# Patient Record
Sex: MALE | Race: WHITE | Employment: OTHER | URBAN - METROPOLITAN AREA
[De-identification: names, ages, dates, MRNs, and addresses within clinical notes are randomized per-mention and may not be internally consistent; named-entity substitution may affect disease eponyms.]

---

## 2020-01-21 ENCOUNTER — HOSPITAL ENCOUNTER (INPATIENT)
Age: 85
LOS: 5 days | Discharge: NURSING, REHAB OR LONG-TERM CARE | DRG: 193 | End: 2020-01-27
Attending: EMERGENCY MEDICINE | Admitting: INTERNAL MEDICINE
Payer: MEDICARE

## 2020-01-21 ENCOUNTER — APPOINTMENT (OUTPATIENT)
Dept: GENERAL RADIOLOGY | Age: 85
DRG: 193 | End: 2020-01-21
Payer: MEDICARE

## 2020-01-21 ENCOUNTER — APPOINTMENT (OUTPATIENT)
Dept: CT IMAGING | Age: 85
DRG: 193 | End: 2020-01-21
Payer: MEDICARE

## 2020-01-21 LAB
A/G RATIO: 1 (ref 1.1–2.2)
ALBUMIN SERPL-MCNC: 4.1 G/DL (ref 3.4–5)
ALP BLD-CCNC: 109 U/L (ref 40–129)
ALT SERPL-CCNC: 24 U/L (ref 10–40)
ANION GAP SERPL CALCULATED.3IONS-SCNC: 10 MMOL/L (ref 3–16)
AST SERPL-CCNC: 27 U/L (ref 15–37)
BACTERIA: ABNORMAL /HPF
BASOPHILS ABSOLUTE: 0.1 K/UL (ref 0–0.2)
BASOPHILS RELATIVE PERCENT: 0.7 %
BILIRUB SERPL-MCNC: 1.1 MG/DL (ref 0–1)
BILIRUBIN URINE: ABNORMAL
BLOOD, URINE: ABNORMAL
BUN BLDV-MCNC: 25 MG/DL (ref 7–20)
CALCIUM SERPL-MCNC: 9.9 MG/DL (ref 8.3–10.6)
CASTS: ABNORMAL /LPF
CHLORIDE BLD-SCNC: 101 MMOL/L (ref 99–110)
CLARITY: CLEAR
CO2: 24 MMOL/L (ref 21–32)
COLOR: YELLOW
CREAT SERPL-MCNC: 1.7 MG/DL (ref 0.8–1.3)
EOSINOPHILS ABSOLUTE: 0 K/UL (ref 0–0.6)
EOSINOPHILS RELATIVE PERCENT: 0.2 %
EPITHELIAL CELLS, UA: ABNORMAL /HPF
GFR AFRICAN AMERICAN: 47
GFR NON-AFRICAN AMERICAN: 39
GLOBULIN: 4.1 G/DL
GLUCOSE BLD-MCNC: 174 MG/DL (ref 70–99)
GLUCOSE URINE: NEGATIVE MG/DL
HCT VFR BLD CALC: 49.9 % (ref 40.5–52.5)
HEMOGLOBIN: 16.9 G/DL (ref 13.5–17.5)
KETONES, URINE: NEGATIVE MG/DL
LEUKOCYTE ESTERASE, URINE: NEGATIVE
LYMPHOCYTES ABSOLUTE: 1.9 K/UL (ref 1–5.1)
LYMPHOCYTES RELATIVE PERCENT: 11.6 %
MCH RBC QN AUTO: 33.4 PG (ref 26–34)
MCHC RBC AUTO-ENTMCNC: 33.8 G/DL (ref 31–36)
MCV RBC AUTO: 98.8 FL (ref 80–100)
MICROSCOPIC EXAMINATION: YES
MONOCYTES ABSOLUTE: 1.5 K/UL (ref 0–1.3)
MONOCYTES RELATIVE PERCENT: 9.2 %
MUCUS: ABNORMAL /LPF
NEUTROPHILS ABSOLUTE: 12.7 K/UL (ref 1.7–7.7)
NEUTROPHILS RELATIVE PERCENT: 78.3 %
NITRITE, URINE: NEGATIVE
PDW BLD-RTO: 12.7 % (ref 12.4–15.4)
PH UA: 5 (ref 5–8)
PLATELET # BLD: 210 K/UL (ref 135–450)
PMV BLD AUTO: 7.6 FL (ref 5–10.5)
POTASSIUM REFLEX MAGNESIUM: 4.4 MMOL/L (ref 3.5–5.1)
PROTEIN UA: 30 MG/DL
RBC # BLD: 5.05 M/UL (ref 4.2–5.9)
RBC UA: ABNORMAL /HPF (ref 0–2)
SODIUM BLD-SCNC: 135 MMOL/L (ref 136–145)
SPECIFIC GRAVITY UA: >=1.03 (ref 1–1.03)
TOTAL PROTEIN: 8.2 G/DL (ref 6.4–8.2)
TROPONIN: 0.03 NG/ML
URINE REFLEX TO CULTURE: ABNORMAL
URINE TYPE: ABNORMAL
UROBILINOGEN, URINE: 0.2 E.U./DL
WBC # BLD: 16.2 K/UL (ref 4–11)
WBC UA: ABNORMAL /HPF (ref 0–5)

## 2020-01-21 PROCEDURE — 80053 COMPREHEN METABOLIC PANEL: CPT

## 2020-01-21 PROCEDURE — 85025 COMPLETE CBC W/AUTO DIFF WBC: CPT

## 2020-01-21 PROCEDURE — 71045 X-RAY EXAM CHEST 1 VIEW: CPT

## 2020-01-21 PROCEDURE — 81001 URINALYSIS AUTO W/SCOPE: CPT

## 2020-01-21 PROCEDURE — 70450 CT HEAD/BRAIN W/O DYE: CPT

## 2020-01-21 PROCEDURE — 84484 ASSAY OF TROPONIN QUANT: CPT

## 2020-01-21 PROCEDURE — 99284 EMERGENCY DEPT VISIT MOD MDM: CPT

## 2020-01-21 RX ORDER — MEMANTINE HYDROCHLORIDE 5 MG/1
5 TABLET ORAL 2 TIMES DAILY
COMMUNITY

## 2020-01-21 RX ORDER — 0.9 % SODIUM CHLORIDE 0.9 %
1000 INTRAVENOUS SOLUTION INTRAVENOUS ONCE
Status: COMPLETED | OUTPATIENT
Start: 2020-01-21 | End: 2020-01-22

## 2020-01-21 RX ORDER — LISINOPRIL 2.5 MG/1
2.5 TABLET ORAL DAILY
COMMUNITY

## 2020-01-21 RX ORDER — SILODOSIN 8 MG/1
8 CAPSULE ORAL EVERY EVENING
COMMUNITY

## 2020-01-21 RX ORDER — ATENOLOL 25 MG/1
25 TABLET ORAL DAILY
COMMUNITY

## 2020-01-22 ENCOUNTER — APPOINTMENT (OUTPATIENT)
Dept: MRI IMAGING | Age: 85
DRG: 193 | End: 2020-01-22
Payer: MEDICARE

## 2020-01-22 PROBLEM — N17.9 AKI (ACUTE KIDNEY INJURY) (HCC): Status: ACTIVE | Noted: 2020-01-22

## 2020-01-22 PROBLEM — J18.9 PNEUMONIA: Status: ACTIVE | Noted: 2020-01-22

## 2020-01-22 PROBLEM — R41.82 AMS (ALTERED MENTAL STATUS): Status: ACTIVE | Noted: 2020-01-22

## 2020-01-22 LAB
ANION GAP SERPL CALCULATED.3IONS-SCNC: 12 MMOL/L (ref 3–16)
ATYPICAL LYMPHOCYTE RELATIVE PERCENT: 2 % (ref 0–6)
BANDED NEUTROPHILS RELATIVE PERCENT: 6 % (ref 0–7)
BASOPHILS ABSOLUTE: 0 K/UL (ref 0–0.2)
BASOPHILS RELATIVE PERCENT: 0 %
BUN BLDV-MCNC: 21 MG/DL (ref 7–20)
CALCIUM SERPL-MCNC: 9 MG/DL (ref 8.3–10.6)
CHLORIDE BLD-SCNC: 107 MMOL/L (ref 99–110)
CO2: 23 MMOL/L (ref 21–32)
CREAT SERPL-MCNC: 1.2 MG/DL (ref 0.8–1.3)
EKG ATRIAL RATE: 111 BPM
EKG DIAGNOSIS: NORMAL
EKG P AXIS: 66 DEGREES
EKG P-R INTERVAL: 204 MS
EKG Q-T INTERVAL: 318 MS
EKG QRS DURATION: 88 MS
EKG QTC CALCULATION (BAZETT): 432 MS
EKG R AXIS: 60 DEGREES
EKG T AXIS: 75 DEGREES
EKG VENTRICULAR RATE: 111 BPM
EOSINOPHILS ABSOLUTE: 0.2 K/UL (ref 0–0.6)
EOSINOPHILS RELATIVE PERCENT: 1 %
GFR AFRICAN AMERICAN: >60
GFR NON-AFRICAN AMERICAN: 58
GLUCOSE BLD-MCNC: 156 MG/DL (ref 70–99)
HCT VFR BLD CALC: 43.9 % (ref 40.5–52.5)
HEMATOLOGY PATH CONSULT: NORMAL
HEMATOLOGY PATH CONSULT: YES
HEMOGLOBIN: 14.7 G/DL (ref 13.5–17.5)
LYMPHOCYTES ABSOLUTE: 7.9 K/UL (ref 1–5.1)
LYMPHOCYTES RELATIVE PERCENT: 48 %
MCH RBC QN AUTO: 33.4 PG (ref 26–34)
MCHC RBC AUTO-ENTMCNC: 33.5 G/DL (ref 31–36)
MCV RBC AUTO: 99.6 FL (ref 80–100)
METAMYELOCYTES RELATIVE PERCENT: 1 %
MONOCYTES ABSOLUTE: 0.2 K/UL (ref 0–1.3)
MONOCYTES RELATIVE PERCENT: 1 %
NEUTROPHILS ABSOLUTE: 7.6 K/UL (ref 1.7–7.7)
NEUTROPHILS RELATIVE PERCENT: 41 %
PDW BLD-RTO: 12.4 % (ref 12.4–15.4)
PLATELET # BLD: 177 K/UL (ref 135–450)
PLATELET SLIDE REVIEW: ADEQUATE
PMV BLD AUTO: 7.5 FL (ref 5–10.5)
POTASSIUM REFLEX MAGNESIUM: 3.9 MMOL/L (ref 3.5–5.1)
PROCALCITONIN: 0.13 NG/ML (ref 0–0.15)
RBC # BLD: 4.41 M/UL (ref 4.2–5.9)
SLIDE REVIEW: ABNORMAL
SODIUM BLD-SCNC: 142 MMOL/L (ref 136–145)
TROPONIN: 0.02 NG/ML
WBC # BLD: 15.8 K/UL (ref 4–11)

## 2020-01-22 PROCEDURE — 6360000002 HC RX W HCPCS: Performed by: EMERGENCY MEDICINE

## 2020-01-22 PROCEDURE — 93010 ELECTROCARDIOGRAM REPORT: CPT | Performed by: INTERNAL MEDICINE

## 2020-01-22 PROCEDURE — 94150 VITAL CAPACITY TEST: CPT

## 2020-01-22 PROCEDURE — 80048 BASIC METABOLIC PNL TOTAL CA: CPT

## 2020-01-22 PROCEDURE — 2580000003 HC RX 258: Performed by: NURSE PRACTITIONER

## 2020-01-22 PROCEDURE — 2580000003 HC RX 258: Performed by: EMERGENCY MEDICINE

## 2020-01-22 PROCEDURE — 93005 ELECTROCARDIOGRAM TRACING: CPT | Performed by: EMERGENCY MEDICINE

## 2020-01-22 PROCEDURE — 6360000002 HC RX W HCPCS: Performed by: NURSE PRACTITIONER

## 2020-01-22 PROCEDURE — 84145 PROCALCITONIN (PCT): CPT

## 2020-01-22 PROCEDURE — 94669 MECHANICAL CHEST WALL OSCILL: CPT

## 2020-01-22 PROCEDURE — 85025 COMPLETE CBC W/AUTO DIFF WBC: CPT

## 2020-01-22 PROCEDURE — 97535 SELF CARE MNGMENT TRAINING: CPT

## 2020-01-22 PROCEDURE — 97162 PT EVAL MOD COMPLEX 30 MIN: CPT

## 2020-01-22 PROCEDURE — 70551 MRI BRAIN STEM W/O DYE: CPT

## 2020-01-22 PROCEDURE — 96365 THER/PROPH/DIAG IV INF INIT: CPT

## 2020-01-22 PROCEDURE — 97166 OT EVAL MOD COMPLEX 45 MIN: CPT

## 2020-01-22 PROCEDURE — 1200000000 HC SEMI PRIVATE

## 2020-01-22 PROCEDURE — 84484 ASSAY OF TROPONIN QUANT: CPT

## 2020-01-22 PROCEDURE — 97116 GAIT TRAINING THERAPY: CPT

## 2020-01-22 PROCEDURE — 36415 COLL VENOUS BLD VENIPUNCTURE: CPT

## 2020-01-22 PROCEDURE — 6370000000 HC RX 637 (ALT 250 FOR IP): Performed by: NURSE PRACTITIONER

## 2020-01-22 RX ORDER — TAMSULOSIN HYDROCHLORIDE 0.4 MG/1
0.4 CAPSULE ORAL DAILY
Status: DISCONTINUED | OUTPATIENT
Start: 2020-01-22 | End: 2020-01-27 | Stop reason: HOSPADM

## 2020-01-22 RX ORDER — SODIUM CHLORIDE 0.9 % (FLUSH) 0.9 %
10 SYRINGE (ML) INJECTION EVERY 12 HOURS SCHEDULED
Status: DISCONTINUED | OUTPATIENT
Start: 2020-01-22 | End: 2020-01-27 | Stop reason: HOSPADM

## 2020-01-22 RX ORDER — ONDANSETRON 2 MG/ML
4 INJECTION INTRAMUSCULAR; INTRAVENOUS EVERY 6 HOURS PRN
Status: DISCONTINUED | OUTPATIENT
Start: 2020-01-22 | End: 2020-01-27 | Stop reason: HOSPADM

## 2020-01-22 RX ORDER — ATORVASTATIN CALCIUM 10 MG/1
10 TABLET, FILM COATED ORAL DAILY
Status: DISCONTINUED | OUTPATIENT
Start: 2020-01-22 | End: 2020-01-27 | Stop reason: HOSPADM

## 2020-01-22 RX ORDER — MEMANTINE HYDROCHLORIDE 5 MG/1
5 TABLET ORAL 2 TIMES DAILY
Status: DISCONTINUED | OUTPATIENT
Start: 2020-01-22 | End: 2020-01-27 | Stop reason: HOSPADM

## 2020-01-22 RX ORDER — SODIUM CHLORIDE 9 MG/ML
INJECTION, SOLUTION INTRAVENOUS CONTINUOUS
Status: DISCONTINUED | OUTPATIENT
Start: 2020-01-22 | End: 2020-01-27 | Stop reason: HOSPADM

## 2020-01-22 RX ORDER — ATENOLOL 25 MG/1
25 TABLET ORAL DAILY
Status: DISCONTINUED | OUTPATIENT
Start: 2020-01-22 | End: 2020-01-27 | Stop reason: HOSPADM

## 2020-01-22 RX ORDER — ACETAMINOPHEN 325 MG/1
650 TABLET ORAL EVERY 4 HOURS PRN
Status: DISCONTINUED | OUTPATIENT
Start: 2020-01-22 | End: 2020-01-27 | Stop reason: HOSPADM

## 2020-01-22 RX ORDER — CLOPIDOGREL BISULFATE 75 MG/1
75 TABLET ORAL DAILY
Status: DISCONTINUED | OUTPATIENT
Start: 2020-01-22 | End: 2020-01-27 | Stop reason: HOSPADM

## 2020-01-22 RX ORDER — ALBUTEROL SULFATE 2.5 MG/3ML
2.5 SOLUTION RESPIRATORY (INHALATION)
Status: DISCONTINUED | OUTPATIENT
Start: 2020-01-22 | End: 2020-01-22

## 2020-01-22 RX ORDER — LISINOPRIL 2.5 MG/1
2.5 TABLET ORAL DAILY
Status: DISCONTINUED | OUTPATIENT
Start: 2020-01-22 | End: 2020-01-27 | Stop reason: HOSPADM

## 2020-01-22 RX ORDER — ALBUTEROL SULFATE 2.5 MG/3ML
2.5 SOLUTION RESPIRATORY (INHALATION) EVERY 4 HOURS PRN
Status: DISCONTINUED | OUTPATIENT
Start: 2020-01-22 | End: 2020-01-27 | Stop reason: HOSPADM

## 2020-01-22 RX ORDER — SODIUM CHLORIDE 0.9 % (FLUSH) 0.9 %
10 SYRINGE (ML) INJECTION PRN
Status: DISCONTINUED | OUTPATIENT
Start: 2020-01-22 | End: 2020-01-27 | Stop reason: HOSPADM

## 2020-01-22 RX ADMIN — MEMANTINE 5 MG: 5 TABLET ORAL at 09:59

## 2020-01-22 RX ADMIN — TAMSULOSIN HYDROCHLORIDE 0.4 MG: 0.4 CAPSULE ORAL at 09:59

## 2020-01-22 RX ADMIN — ATORVASTATIN CALCIUM 10 MG: 10 TABLET, FILM COATED ORAL at 09:58

## 2020-01-22 RX ADMIN — SODIUM CHLORIDE: 9 INJECTION, SOLUTION INTRAVENOUS at 16:46

## 2020-01-22 RX ADMIN — CLOPIDOGREL BISULFATE 75 MG: 75 TABLET ORAL at 09:58

## 2020-01-22 RX ADMIN — ENOXAPARIN SODIUM 30 MG: 30 INJECTION SUBCUTANEOUS at 09:48

## 2020-01-22 RX ADMIN — ACETAMINOPHEN 650 MG: 325 TABLET ORAL at 20:39

## 2020-01-22 RX ADMIN — Medication 10 ML: at 20:39

## 2020-01-22 RX ADMIN — SODIUM CHLORIDE 1000 ML: 9 INJECTION, SOLUTION INTRAVENOUS at 00:23

## 2020-01-22 RX ADMIN — AZITHROMYCIN MONOHYDRATE 500 MG: 500 INJECTION, POWDER, LYOPHILIZED, FOR SOLUTION INTRAVENOUS at 04:00

## 2020-01-22 RX ADMIN — SODIUM CHLORIDE: 9 INJECTION, SOLUTION INTRAVENOUS at 02:15

## 2020-01-22 RX ADMIN — ATENOLOL 25 MG: 25 TABLET ORAL at 09:58

## 2020-01-22 RX ADMIN — LISINOPRIL 2.5 MG: 2.5 TABLET ORAL at 09:51

## 2020-01-22 RX ADMIN — MEMANTINE 5 MG: 5 TABLET ORAL at 20:39

## 2020-01-22 RX ADMIN — CEFTRIAXONE SODIUM 1 G: 1 INJECTION, POWDER, FOR SOLUTION INTRAMUSCULAR; INTRAVENOUS at 00:23

## 2020-01-22 SDOH — HEALTH STABILITY: MENTAL HEALTH: HOW OFTEN DO YOU HAVE A DRINK CONTAINING ALCOHOL?: NEVER

## 2020-01-22 ASSESSMENT — PAIN SCALES - PAIN ASSESSMENT IN ADVANCED DEMENTIA (PAINAD)
BODYLANGUAGE: 0
TOTALSCORE: 0
FACIALEXPRESSION: 0
NEGVOCALIZATION: 0
CONSOLABILITY: 0
BREATHING: 0

## 2020-01-22 ASSESSMENT — PAIN SCALES - GENERAL: PAINLEVEL_OUTOF10: 0

## 2020-01-22 NOTE — PROGRESS NOTES
AROM:WFL  BUE strength: WFL      Plan   Plan  Times per week: 2-3x's a week while in acute care           AM-PAC Score        AM-PAC Inpatient Daily Activity Raw Score: 15 (01/22/20 1310)  AM-PAC Inpatient ADL T-Scale Score : 34.69 (01/22/20 1310)  ADL Inpatient CMS 0-100% Score: 56.46 (01/22/20 1310)  ADL Inpatient CMS G-Code Modifier : CK (01/22/20 1310)    Goals  Short term goals  Time Frame for Short term goals: 1 week unless otherwise specified by 1/29  Short term goal 1: Pt will complete LE dressing with Jace  Short term goal 2: Pt will complete toilet transfers with Jace by 1/27  Short term goal 3: Pt will complete standing level ADLs with Jace for balance  Patient Goals   Patient goals : Pt did not voice       Therapy Time   Individual Concurrent Group Co-treatment   Time In 0906         Time Out 0930         Minutes 24         Timed Code Treatment Minutes: 14 Minutes       Remy Garcia OTR/L

## 2020-01-22 NOTE — DISCHARGE INSTR - COC
Continuity of Care Form    Patient Name: Joel Hou   :  1935  MRN:  [id number]    Admit date:  2020  Discharge date:  20    Code Status Order: Full Code   Advance Directives:   Advance Care Flowsheet Documentation     Date/Time Healthcare Directive Type of Healthcare Directive Copy in [address] Agent's Name Healthcare Agent's Phone Number    20 0201  Yes, patient has an advance directive for healthcare treatment  Living will;Durable power of  for health care  No, copy requested from family  Healthcare power of   Kaylee Haider  [phone]          Admitting Physician:  Marie Chowdary MD  PCP: [facility]    Discharging Nurse: [facility] Unit/Room#: 0074/7667-87  Discharging Unit Phone Number: [phone]    Emergency Contact:   Extended Emergency Contact Information  Primary Emergency Contact: Racquel Vinson [address] Phone: [phone]  Relation: Spouse    Past Surgical History:  Past Surgical History:   Procedure Laterality Date    APPENDECTOMY      BLADDER SURGERY      HERNIA REPAIR         Immunization History: There is no immunization history on file for this patient.     Active Problems:  Patient Active Problem List   Diagnosis Code    Other and unspecified hyperlipidemia E78.5    Coronary atherosclerosis of native coronary artery I25.10    AMS (altered mental status) R41.82    Pneumonia J18.9    DIMAS (acute kidney injury) ([facility] [address] N17.9       Isolation/Infection:   Isolation          No Isolation        Patient Infection Status     None to display          Nurse Assessment:  Last Vital Signs: /69   Pulse 102   Temp 97.9 °F (36.6 °C) (Axillary)   Resp 16   Ht 5' 10\" (1.778 m)   Wt 158 lb (71.7 kg)   SpO2 92%   BMI 22.67 kg/m²     Last documented pain score (0-10 scale):    Last Weight:   Wt Readings from Last 1 Encounters:   20 158 lb (71.7 kg) Mental Status:  disoriented    IV Access:  - None    Nursing Mobility/ADLs:  Walking   Dependent  Transfer  Dependent  Bathing  Dependent  Dressing  Dependent  Toileting  Dependent  Feeding  Assisted  Med Admin  Dependent  Med Delivery   crushed and prefers mixed with pudding or ice cream (vanilla)    Wound Care Documentation and Therapy:  Wound 01/22/20 Buttocks Inner;Right open wound (Active)   Wound Pressure Stage  2 1/22/2020  9:45 AM   Dressing Status Other (Comment) 1/22/2020  9:45 AM   Number of days: 0        Elimination:  Continence:   · Bowel: No  · Bladder: No  Urinary Catheter: None   Colostomy/Ileostomy/Ileal Conduit: No       Date of Last BM: 1/27/2020    Intake/Output Summary (Last 24 hours) at 1/22/2020 1218  Last data filed at 1/22/2020 0443  Gross per 24 hour   Intake 60 ml   Output --   Net 60 ml     I/O last 3 completed shifts: In: 61 [P.O.:60]  Out: -     Safety Concerns:     Sund[employer] [place], History of Falls (last 30 days), At Risk for Falls and risk for skin breakdown r/t impaired mobility    Impairments/Disabilities:      cognative/dementia    Nutrition Therapy:  Current Nutrition Therapy:   - Oral Diet:  Dental Soft    Routes of Feeding: Oral  Liquids: Thin Liquids  Daily Fluid Restriction: no  Last Modified Barium Swallow with Video (Video Swallowing Test): not done    Treatments at the Time of Hospital Discharge:   Respiratory Treatments: n/a  Oxygen Therapy:  is not on home oxygen therapy.   Ventilator:    - No ventilator support    Rehab Therapies: Physical Therapy and Occupational Therapy  Weight Bearing Status/Restrictions: No weight bearing restirctions  Other Medical Equipment (for information only, NOT a DME order):  wheelchair and hospital bed  Other Treatments: n/a    Patient's personal belongings (please select all that are sent with patient):  None    RN SIGNATURE:  Electronically signed by Tenzin Barbour RN on 1/27/20 at 4:58 PM    CASE MANAGEMENT/SOCIAL WORK

## 2020-01-22 NOTE — PROGRESS NOTES
Pt alert but nonverbal with staff. Shift assessment updated and documented. VSS. Will continue to monitor.

## 2020-01-22 NOTE — PROGRESS NOTES
4 Eyes Skin Assessment     The patient is being assess for  Admission    I agree that 2 RN's have performed a thorough Head to Toe Skin Assessment on the patient. ALL assessment sites listed below have been assessed. Areas assessed by both nurses:    [x]   Head, Face, and Ears   [x]   Shoulders, Back, and Chest  [x]   Arms, Elbows, and Hands   [x]   Coccyx, Sacrum, and Ischum  [x]   Legs, Feet, and Heels    Stage 2 R buttocks. Pt would not allow us to put dressing on wound         Does the Patient have Skin Breakdown?   Yes a wound was noted on the Admission Assessment and an WOUND LDA was Initiated documentation include the Ivet-wound, Wound Assessment, Measurements, Dressing Treatment, Drainage, and Color\",         Kp Prevention initiated:  Yes   Wound Care Orders initiated:  No      WOC nurse consulted for Pressure Injury (Stage 3,4, Unstageable, DTI, NWPT, and Complex wounds):  No      Nurse 1 eSignature: Electronically signed by Raghav James RN on 1/22/20 at 4:41 AM    **SHARE this note so that the co-signing nurse is able to place an eSignature**    Nurse 2 eSignature: Electronically signed by Jermaine Padron RN on 1/22/20 at 7:27 AM

## 2020-01-22 NOTE — H&P
breath, numbness, tingling, N/V/C/D, fever and/or chills. Past Medical History:          Diagnosis Date    CAD (coronary artery disease)      Past Surgical History:          Procedure Laterality Date    APPENDECTOMY      BLADDER SURGERY      HERNIA REPAIR       Medications Prior to Admission:      Prior to Admission medications    Medication Sig Start Date End Date Taking? Authorizing Provider   memantine (NAMENDA) 5 MG tablet Take 5 mg by mouth 2 times daily   Yes Historical Provider, MD   atenolol (TENORMIN) 25 MG tablet Take 25 mg by mouth daily   Yes Historical Provider, MD   lisinopril (PRINIVIL;ZESTRIL) 2.5 MG tablet Take 2.5 mg by mouth daily   Yes Historical Provider, MD   silodosin (RAPAFLO) 8 MG CAPS Take 8 mg by mouth every evening   Yes Historical Provider, MD   atorvastatin (LIPITOR) 10 MG tablet TAKE 1 TABLET EVERY DAY 3/5/14  Yes Savanna Trejo MD   clopidogrel (PLAVIX) 75 MG tablet Take 1 tablet by mouth daily. 11/5/13  Yes Savanna Trejo MD   sildenafil (VIAGRA) 50 MG tablet Take 50 mg by mouth as needed. Yes Historical Provider, MD   cetirizine (ZYRTEC) 10 MG tablet Take 10 mg by mouth daily. Yes Historical Provider, MD     Allergies:  Penicillins    Social History:      The patient currently lives at home with his wife    TOBACCO:   reports that he has quit smoking. He has never used smokeless tobacco.  ETOH:   reports current alcohol use of about 1.0 standard drinks of alcohol per week. E-Cigarettes Vaping or Juuling     Questions Responses    E-Cigarette Use     Start Date     Does device contain nicotine? Quit Date     E-Cigarette Type         Family History:      Reviewed in detail. Positive as follows:        Problem Relation Age of Onset    High Cholesterol Mother     High Cholesterol Father      REVIEW OF SYSTEMS:   Pertinent positives as noted in the HPI. All other systems reviewed and negative.     PHYSICAL EXAM PERFORMED:    /73   Pulse 98   Temp 98 °F (36.7 °C) (Oral)   Resp 16   Ht 5' 10\" (1.778 m)   Wt 158 lb (71.7 kg)   SpO2 95%   BMI 22.67 kg/m²     General appearance:  Elderly male in no apparent distress, appears stated age and cooperative. HEENT:  Pupils equal, round, and reactive to light. Extra ocular muscles intact. Conjunctivae/corneas clear. Neck: Supple, with full range of motion. No jugular venous distention. Trachea midline. Respiratory:  Normal respiratory effort. Clear to auscultation, bilaterally without Rales/Wheezes/Rhonchi. Cardiovascular:  Regular rate and rhythm with normal S1/S2 without murmurs, rubs or gallops. Abdomen: Soft, non-tender, non-distended with normal bowel sounds. Musculoskeletal:  No clubbing, cyanosis or edema bilaterally. Full range of motion without deformity. Skin: Skin color, texture, turgor normal.  No significant rashes or lesions. Neurologic:  Neurovascularly intact.  Cranial nerves: II-XII intact, grossly non-focal.  Psychiatric:  Alert and oriented to self only, thought content appropriate, normal insight  Capillary Refill: Brisk,< 3 seconds   Peripheral Pulses: +2 palpable, equal bilaterally     Labs:     Recent Labs     01/21/20  2315   WBC 16.2*   HGB 16.9   HCT 49.9        Recent Labs     01/21/20  2315   *   K 4.4      CO2 24   BUN 25*   CREATININE 1.7*   CALCIUM 9.9     Recent Labs     01/21/20  2315   AST 27   ALT 24   BILITOT 1.1*   ALKPHOS 109     Recent Labs     01/21/20  2315   TROPONINI 0.03*     Urinalysis:      Lab Results   Component Value Date    NITRU Negative 01/21/2020    WBCUA 0-2 01/21/2020    BACTERIA 1+ 01/21/2020    RBCUA 3-5 01/21/2020    BLOODU MODERATE 01/21/2020    SPECGRAV >=1.030 01/21/2020    GLUCOSEU Negative 01/21/2020     Radiology:     CXR: I have reviewed the CXR with the following interpretation: suspected left pleural effusion with left basilar atelectasis versus pneumonia    EKG:  I have reviewed the EKG with the following interpretation: or concerns please feel free to contact me at [phone].  -----------------------------------Dr. Dara Marvin----------------------------------------------

## 2020-01-22 NOTE — CARE COORDINATION
CASE MANAGEMENT INITIAL ASSESSMENT      Reviewed chart and met with patient today, re: 80 y.o. male order SNF  Explained Case Management role/services. Family present: wife and daughter, POA  Primary contact information: Katelin Phillip    Admit date/status: 1/22/20  Diagnosis: PNA  Is this a Readmission?: n     Insurance: medicare  Precert required for SNF - N        3 night stay required - Y    Living arrangements, Adls, care needs, prior to admission: lives in ranch style home with wife    Transportation: tbd    Durable Medical Equipment at home: Walker__Cane__RTS__ BSC__Shower Chair__  02__ HHN__ CPAP__  [facility] Bed__ W/C__x_ Other__________    Services in the home and/or outpatient, prior to admission: none    Dialysis Facility (if applicable) none  · Name:  · Address:  · Dialysis Schedule:  · Phone:  · Fax:    PT/OT recs: pending    Hospital Exemption Notification (HEN): needed for SNF    Barriers to discharge: none    Plan/comments: spoke with wife she states patient has dementia. She is primary care giver. Patient doesn't understand how to use walker consistently, and has frequent falls. Reports has had to have squad respond multiple times for lift assist. Stated patient may need long term memory care after skilled stay. Provided SNF list to review requested to make a few choices. Will continue to follow for therapy recommendations.  Philomena Almodovar RN      ECOC on chart for MD signature

## 2020-01-22 NOTE — PROGRESS NOTES
Clear = 0    Sputum   ,  , Sputum How Obtained: None  Cough: Strong, spontaneous, non-productive = 0    Vital Signs   /70   Pulse 105   Temp 98.3 °F (36.8 °C) (Axillary)   Resp 16   Ht 5' 10\" (1.778 m)   Wt 158 lb (71.7 kg)   SpO2 94%   BMI 22.67 kg/m²   SPO2 (COPD values may differ): Greater than or equal to 92% on room air = 0    Peak Flow (asthma only): not applicable = 0    RSI: 5-6 = Q4hr PRN (every four hours as needed) for dyspnea        Plan       Goals: medication delivery, mobilize retained secretions, volume expansion and improve oxygenation    Patient/caregiver was educated on the proper method of use for Respiratory Care Devices:  No:       Level of patient/caregiver understanding able to:   ? Verbalize understanding   ? Demonstrate understanding       ? Teach back        ? Needs reinforcement       ? No available caregiver               ? Other:     Response to education:  Poor     Is patient being placed on Home Treatment Regimen? No     Does the patient have everything they need prior to discharge? NA     Comments: pt assessed and chart reviewed    Plan of Care: Albuterol Q4 PRN    Electronically signed by Francis Louie RCP on 1/22/2020 at 2:46 AM    Respiratory Protocol Guidelines     1. Assessment and treatment by Respiratory Therapy will be initiated for medication and therapeutic interventions upon initiation of aerosolized medication. 2. Physician will be contacted for respiratory rate (RR) greater than 35 breaths per minute. Therapy will be held for heart rate (HR) greater than 140 beats per minute, pending direction from physician. 3. Bronchodilators will be administered via Metered Dose Inhaler (MDI) with spacer when the following criteria are met:  a. Alert and cooperative     b. HR < 140 bpm  c. RR < 30 bpm                d. Can demonstrate a 2-3 second inspiratory hold  4.  Bronchodilators will be administered via Hand Held Nebulizer [facility]) to patients when ANY of the following criteria are met  a. Incognizant or uncooperative          b. Patients treated with HHN at Home        c. Unable to demonstrate proper use of MDI with spacer     d. RR > 30 bpm   5. Bronchodilators will be delivered via Metered Dose Inhaler (MDI), HHN, Aerogen to intubated patients on mechanical ventilation. 6. Inhalation medication orders will be delivered and/or substituted as outlined below. Aerosolized Medications Ordering and Administration Guidelines:    1. All Medications will be ordered by a physician, and their frequency and/or modality will be adjusted as defined by the patients Respiratory Severity Index (RSI) score. 2. If the patient does not have documented COPD, consider discontinuing anticholinergics when RSI is less than 9.  3. If the bronchospasm worsens (increased RSI), then the bronchodilator frequency can be increased to a maximum of every 4 hours. If greater than every 4 hours is required, the physician will be contacted. 4. If the bronchospasm improves, the frequency of the bronchodilator can be decreased, based on the patient's RSI, but not less than home treatment regimen frequency. 5. Bronchodilator(s) will be discontinued if patient has a RSI less than 9 and has received no scheduled or as needed treatment for 72  Hrs. Patients Ordered on a Mucolytic Agent:    1. Must always be administered with a bronchodilator. 2. Discontinue if patient experiences worsened bronchospasm, or secretions have lessened to the point that the patient is able to clear them with a cough. Anti-inflammatory and Combination Medications:    1. If the patient lacks prior history of lung disease, is not using inhaled anti-inflammatory medication at home, and lacks wheezing by examination or by history for at least 24 hours, contact physician for possible discontinuation.

## 2020-01-22 NOTE — PROGRESS NOTES
Physical Therapy    Facility/Department: [facility] C3 TELE/MED SURG/ONC  Initial Assessment and Treatment    NAME: Joel Hou  : 1935  MRN: [id number]    Date of Service: 2020    Discharge Recommendations:  Subacute/Skilled Nursing Facility   PT Equipment Recommendations  Equipment Needed: No    Assessment   Body structures, Functions, Activity limitations: Decreased functional mobility ; Decreased cognition;Decreased endurance;Decreased coordination;Decreased strength;Decreased balance;Decreased safe awareness  Assessment: Pt is 81 yo male who presents with diagnosis of PNA, confusion, and frequent falls. Unable to obtain PLOF d/t poor historian and limited verbalizations throughout session. Pt max A x 2 for bed mobility, standing, and taking steps to chair. Pt would benefit from continued skilled therapy to address deficits. Recommend SNF at d/c d/t high fall risk and significantly impaired functional mobility. Treatment Diagnosis: decreased functional mobility  Specific instructions for Next Treatment: progress mobility as tolerated  Prognosis: Fair  Decision Making: Medium Complexity  PT Education: Goals;PT Role;General Safety;Plan of Care;Gait Training;Transfer Training;Functional Mobility Training  Patient Education: pt will require reinforcement d/t impaired cognition  Barriers to Learning: cognition  REQUIRES PT FOLLOW UP: Yes  Activity Tolerance  Activity Tolerance: Patient limited by cognitive status       Patient Diagnosis(es): The primary encounter diagnosis was Fall, initial encounter. Diagnoses of Altered mental status, unspecified altered mental status type, At high risk for falls, At risk for injury associated with anticoagulation, and Community acquired pneumonia of left lung, unspecified part of lung were also pertinent to this visit. has a past medical history of CAD (coronary artery disease) and Cancer ([facility] [address]    has a past surgical history that includes Appendectomy; hernia repair; and Bladder surgery. Restrictions  Restrictions/Precautions  Restrictions/Precautions: General Precautions, Fall Risk  Position Activity Restriction  Other position/activity restrictions: Up as tolerated; Avasys  Vision/Hearing  Vision: Impaired  Vision Exceptions: Wears glasses at all times  Hearing: Within functional limits     Subjective  General  Chart Reviewed: Yes  Patient assessed for rehabilitation services?: Yes  Additional Pertinent Hx: Dementia  Response To Previous Treatment: Not applicable  Family / Caregiver Present: No  Referring Practitioner: DENISE Bah  Referral Date : 01/22/20  Diagnosis: PNA, confusion, frequent falls  Follows Commands: Impaired  Other (Comment): Follows less than 50% of simple commands  General Comment  Comments: Pt resting in bed on approach; RN cleared pt for therapy  Subjective  Subjective: pt minimally verbal throughout session  Pain Screening  Patient Currently in Pain: (no visual signs of pain, pt unable to verbalized)       Orientation  Orientation  Overall Orientation Status: Impaired  Orientation Level: Disoriented X4  Social/Functional History  Social/Functional History  Lives With: Spouse  Type of Home: House  Additional Comments: Patient poor historian unable to obtain above PLOF. Objective        AROM RLE (degrees)  RLE AROM: WFL  AROM LLE (degrees)  LLE AROM : WFL  Strength RLE  Comment: Unable to assess d/t dementia; anticipate at least functional  Strength LLE  Comment: Unable to assess d/t dementia; anticipate at least functional        Bed mobility  Supine to Sit: Dependent/Total(HOB elevated, max A x 2 with little partcipation from pt d/t poor command following)  Sit to Supine: Unable to assess(pt up in chair at end of session)     Transfers  Sit to Stand: Dependent/Total  Stand to sit: Dependent/Total  Comment: Max A  x 2 for sit<>stand from EOB with bilateral HHA.  Pt completing 2 partial stands to don underwear followed by one full stand to step to chair     Ambulation  Ambulation?: Yes  Ambulation 1  Surface: level tile  Device: Hand-Held Assist  Assistance: Dependent/Total(max A x 2)  Quality of Gait: Max cues for sequencing with steps to chair. Cues for upright posture and increased hip extension. No knee buckling observed but increased forward flexed posture with decreased balance nad control  Gait Deviations: Shuffles;Decreased step length;Decreased step height;Decreased head and trunk rotation; Slow Hoa  Distance: 3 ft to chair     Balance  Sitting - Static: Good  Sitting - Dynamic: Fair  Standing - Static: Poor  Standing - Dynamic: Poor  Comments: CGA-min A for sitting EOB. Max A x 2 for standing balance        Plan   Plan  Times per week: 2-3x/wk  Times per day: Daily  Specific instructions for Next Treatment: progress mobility as tolerated  Current Treatment Recommendations: Strengthening, Gait Training, Patient/Caregiver Education & Training, ROM, Equipment Evaluation, Education, & procurement, Balance Training, Neuromuscular Re-education, Functional Mobility Training, Endurance Training, Transfer Training, Safety Education & Training  Safety Devices  Type of devices: All fall risk precautions in place, Call light within reach, Chair alarm in place, Gait belt, Patient at risk for falls, Nurse notified, Maria Eugenia West in use, Left in chair                        AM-PAC Score  AM-PAC Inpatient Mobility Raw Score : 10 (01/22/20 1206)  AM-PAC Inpatient T-Scale Score : 32.29 (01/22/20 1206)  Mobility Inpatient CMS 0-100% Score: 76.75 (01/22/20 1206)  Mobility Inpatient CMS G-Code Modifier : CL (01/22/20 1206)          Goals  Short term goals  Time Frame for Short term goals: 1 week (1/29) unless otherwise specified  Short term goal 1: Pt will be mod A for bed mobility. Short term goal 2: Pt will be mod A for transfers with LRAD. Short term goal 3: Pt will ambulate 10 ft with mod A x 2 and LRAD.   Short term goal 4: 1/26: Pt will participate in 10 reps of BLE exercises to promote strength and activity tolerance.   Patient Goals   Patient goals : none stated by pt d/t dementia       Therapy Time   Individual Concurrent Group Co-treatment   Time In 0906         Time Out 0930         Minutes 24         Timed Code Treatment Minutes: [address], PT, DPT

## 2020-01-22 NOTE — ED PROVIDER NOTES
CHIEF COMPLAINT  Fall (pt with memory issues. . per family fell out of bed and isnt acting the same. . was tachy for squad ( was hr  on arrival) pt o2 was 94)      HISTORY OF PRESENT ILLNESS  Madi Macias is a 80 y.o. male who presents to the ED with report of increasing confusion and frequent falls for the past few days he has fallen out of bed fallen off the couch is fallen while walking and while walking seems to be trying to grab the wall for balance. Patient lives with his elderly wife she is unable to get him up when he falls and is frequently had to call EMS about a dozen times in the past couple of weeks. She is concerned that she is no longer able to care for him. Patient is also on Plavix has a history of coronary artery stents. Patient is not able to give any information and just moans when you talk to him. No other complaints, modifying factors or associated symptoms. I have reviewed the following from the nursing documentation. Past Medical History:   Diagnosis Date    CAD (coronary artery disease)      Past Surgical History:   Procedure Laterality Date    APPENDECTOMY      BLADDER SURGERY      HERNIA REPAIR       Family History   Problem Relation Age of Onset    High Cholesterol Mother     High Cholesterol Father      Social History     Socioeconomic History    Marital status:      Spouse name: Not on file    Number of children: Not on file    Years of education: Not on file    Highest education level: Not on file   Occupational History    Not on file   Social Needs    Financial resource strain: Not on file    Food insecurity:     Worry: Not on file     Inability: Not on file    Transportation needs:     Medical: Not on file     Non-medical: Not on file   Tobacco Use    Smoking status: Former Smoker    Smokeless tobacco: Never Used    Tobacco comment: stopped over 50 years ago   Substance and Sexual Activity    Alcohol use:  Yes     Alcohol/week: 1.0 standard drinks     Types: 1 Glasses of wine per week     Comment: minimal    Drug use: No    Sexual activity: Yes     Partners: Female     Comment:    Lifestyle    Physical activity:     Days per week: Not on file     Minutes per session: Not on file    Stress: Not on file   Relationships    Social connections:     Talks on phone: Not on file     Gets together: Not on file     Attends Buddhism service: Not on file     Active member of club or organization: Not on file     Attends meetings of clubs or organizations: Not on file     Relationship status: Not on file    Intimate partner violence:     Fear of current or ex partner: Not on file     Emotionally abused: Not on file     Physically abused: Not on file     Forced sexual activity: Not on file   Other Topics Concern    Not on file   Social History Narrative    Not on file     Current Facility-Administered Medications   Medication Dose Route Frequency Provider Last Rate Last Dose    0.9 % sodium chloride bolus  1,000 mL Intravenous Once Bridgett Apt, DO         Current Outpatient Medications   Medication Sig Dispense Refill    atorvastatin (LIPITOR) 10 MG tablet TAKE 1 TABLET EVERY DAY 90 tablet PRN    clopidogrel (PLAVIX) 75 MG tablet Take 1 tablet by mouth daily. 90 tablet 1    aspirin 81 MG tablet Take 81 mg by mouth daily.  sildenafil (VIAGRA) 50 MG tablet Take 50 mg by mouth as needed.  cetirizine (ZYRTEC) 10 MG tablet Take 10 mg by mouth daily. Allergies   Allergen Reactions    Penicillins        REVIEW OF SYSTEMS  10 systems reviewed, pertinent positives per HPI otherwise noted to be negative. PHYSICAL EXAM  /73   Pulse 116   Temp 98 °F (36.7 °C) (Oral)   Resp 16   Ht 5' 10\" (1.778 m)   Wt 158 lb (71.7 kg)   SpO2 94%   BMI 22.67 kg/m²   GENERAL APPEARANCE: Awake and alert. Cooperative. No acute distress. HEAD: Normocephalic. Atraumatic. EYES: PERRL. EOM's grossly intact.    ENT: Mucous membranes are DRY.   NECK: Supple. HEART: RRR. CHEST/LUNGS: Chest atraumatic, nontender, respirations unlabored. CTAB. Good air exchange. BACK: No midline spinal tenderness or step-off. ABDOMEN: Soft. Non-distended. Non-tender. No guarding or rebound. Normal bowel sounds. EXTREMITIES: No peripheral edema. Moves all extremities equally. All extremities neurovascularly intact. SKIN: Warm and dry. No acute rashes. NEUROLOGICAL: Alert nonverbal. CN 2-12 intact, No gross facial drooping. Strength 4/5 globally,. Normal coordination. PSYCHIATRIC: Normal mood and affect. LABS  I have reviewed all labs for this visit.    Results for orders placed or performed during the hospital encounter of 01/21/20   CBC Auto Differential   Result Value Ref Range    WBC 16.2 (H) 4.0 - 11.0 K/uL    RBC 5.05 4.20 - 5.90 M/uL    Hemoglobin 16.9 13.5 - 17.5 g/dL    Hematocrit 49.9 40.5 - 52.5 %    MCV 98.8 80.0 - 100.0 fL    MCH 33.4 26.0 - 34.0 pg    MCHC 33.8 31.0 - 36.0 g/dL    RDW 12.7 12.4 - 15.4 %    Platelets 276 470 - 254 K/uL    MPV 7.6 5.0 - 10.5 fL    Neutrophils % 78.3 %    Lymphocytes % 11.6 %    Monocytes % 9.2 %    Eosinophils % 0.2 %    Basophils % 0.7 %    Neutrophils Absolute 12.7 (H) 1.7 - 7.7 K/uL    Lymphocytes Absolute 1.9 1.0 - 5.1 K/uL    Monocytes Absolute 1.5 (H) 0.0 - 1.3 K/uL    Eosinophils Absolute 0.0 0.0 - 0.6 K/uL    Basophils Absolute 0.1 0.0 - 0.2 K/uL   Comprehensive Metabolic Panel w/ Reflex to MG   Result Value Ref Range    Sodium 135 (L) 136 - 145 mmol/L    Potassium reflex Magnesium 4.4 3.5 - 5.1 mmol/L    Chloride 101 99 - 110 mmol/L    CO2 24 21 - 32 mmol/L    Anion Gap 10 3 - 16    Glucose 174 (H) 70 - 99 mg/dL    BUN 25 (H) 7 - 20 mg/dL    CREATININE 1.7 (H) 0.8 - 1.3 mg/dL    GFR Non-African American 39 (A) >60    GFR  47 (A) >60    Calcium 9.9 8.3 - 10.6 mg/dL    Total Protein 8.2 6.4 - 8.2 g/dL    Alb 4.1 3.4 - 5.0 g/dL    Albumin/Globulin Ratio 1.0 (L) 1.1 - 2.2    Total Bilirubin 1.1 (H) 0.0 - 1.0 mg/dL    Alkaline Phosphatase 109 40 - 129 U/L    ALT 24 10 - 40 U/L    AST 27 15 - 37 U/L    Globulin 4.1 g/dL   Urinalysis Reflex to Culture   Result Value Ref Range    Color, UA Yellow Straw/Yellow    Clarity, UA Clear Clear    Glucose, Ur Negative Negative mg/dL    Bilirubin Urine SMALL (A) Negative    Ketones, Urine Negative Negative mg/dL    Specific Gravity, UA >=1.030 1.005 - 1.030    Blood, Urine MODERATE (A) Negative    pH, UA 5.0 5.0 - 8.0    Protein, UA 30 (A) Negative mg/dL    Urobilinogen, Urine 0.2 <2.0 E.U./dL    Nitrite, Urine Negative Negative    Leukocyte Esterase, Urine Negative Negative    Microscopic Examination YES     Urine Type NotGiven     Urine Reflex to Culture Not Indicated    Troponin   Result Value Ref Range    Troponin 0.03 (H) <0.01 ng/mL   Microscopic Urinalysis   Result Value Ref Range    Casts 20-40 Hyaline (A) /LPF    Mucus, UA 2+ (A) /LPF    WBC, UA 0-2 0 - 5 /HPF    RBC, UA 3-5 (A) 0 - 2 /HPF    Epi Cells 0-2 /HPF    Bacteria, UA 1+ (A) /HPF       RADIOLOGY  X-RAYS:  I have reviewed radiologic plain film image(s). ALL OTHER NON-PLAIN FILM IMAGES SUCH AS CT, ULTRASOUND AND MRI HAVE BEEN READ BY THE RADIOLOGIST. XR CHEST PORTABLE   Final Result   Suspected left pleural effusion with left basilar atelectasis versus   pneumonia. CT Head WO Contrast    (Results Pending)          ED COURSE/MDM  Patient seen and evaluated. Patient presents here with increasing falls and altered mental status had a long discussion with the patient's wife and she thinks that she is no longer able to care for him and probably will need to be placed somewhere for memory care I tend to agree with her considering the story that I am getting of increasing falls and confusion and the fact that he is on Plavix but we will also check a urine here. His troponin is slightly elevated at 0.03 but this may be due to his kidney function.   We will get a CT of the head as well as

## 2020-01-23 PROCEDURE — 6360000002 HC RX W HCPCS: Performed by: NURSE PRACTITIONER

## 2020-01-23 PROCEDURE — 97110 THERAPEUTIC EXERCISES: CPT

## 2020-01-23 PROCEDURE — 2580000003 HC RX 258: Performed by: NURSE PRACTITIONER

## 2020-01-23 PROCEDURE — 1200000000 HC SEMI PRIVATE

## 2020-01-23 PROCEDURE — 6370000000 HC RX 637 (ALT 250 FOR IP): Performed by: NURSE PRACTITIONER

## 2020-01-23 RX ADMIN — SODIUM CHLORIDE: 9 INJECTION, SOLUTION INTRAVENOUS at 17:49

## 2020-01-23 RX ADMIN — AZITHROMYCIN MONOHYDRATE 500 MG: 500 INJECTION, POWDER, LYOPHILIZED, FOR SOLUTION INTRAVENOUS at 03:08

## 2020-01-23 RX ADMIN — SODIUM CHLORIDE: 9 INJECTION, SOLUTION INTRAVENOUS at 06:10

## 2020-01-23 RX ADMIN — TAMSULOSIN HYDROCHLORIDE 0.4 MG: 0.4 CAPSULE ORAL at 10:17

## 2020-01-23 RX ADMIN — CLOPIDOGREL BISULFATE 75 MG: 75 TABLET ORAL at 10:17

## 2020-01-23 RX ADMIN — ENOXAPARIN SODIUM 30 MG: 30 INJECTION SUBCUTANEOUS at 10:15

## 2020-01-23 RX ADMIN — LISINOPRIL 2.5 MG: 2.5 TABLET ORAL at 10:17

## 2020-01-23 RX ADMIN — MEMANTINE 5 MG: 5 TABLET ORAL at 10:17

## 2020-01-23 RX ADMIN — ATENOLOL 25 MG: 25 TABLET ORAL at 10:17

## 2020-01-23 RX ADMIN — Medication 10 ML: at 22:02

## 2020-01-23 RX ADMIN — CEFTRIAXONE SODIUM 1 G: 1 INJECTION, POWDER, FOR SOLUTION INTRAMUSCULAR; INTRAVENOUS at 00:21

## 2020-01-23 RX ADMIN — ACETAMINOPHEN 650 MG: 325 TABLET ORAL at 21:39

## 2020-01-23 RX ADMIN — ATORVASTATIN CALCIUM 10 MG: 10 TABLET, FILM COATED ORAL at 10:17

## 2020-01-23 RX ADMIN — MEMANTINE 5 MG: 5 TABLET ORAL at 21:39

## 2020-01-23 ASSESSMENT — PAIN SCALES - PAIN ASSESSMENT IN ADVANCED DEMENTIA (PAINAD)
BREATHING: 1
FACIALEXPRESSION: 1
BREATHING: 0
BREATHING: 0
BODYLANGUAGE: 0
CONSOLABILITY: 0
BREATHING: 0
TOTALSCORE: 0
NEGVOCALIZATION: 0
BODYLANGUAGE: 0
FACIALEXPRESSION: 0
TOTALSCORE: 0
BODYLANGUAGE: 0
BODYLANGUAGE: 0
FACIALEXPRESSION: 0
NEGVOCALIZATION: 0
NEGVOCALIZATION: 0
TOTALSCORE: 0
TOTALSCORE: 3
BODYLANGUAGE: 1
CONSOLABILITY: 0
NEGVOCALIZATION: 0
TOTALSCORE: 0
CONSOLABILITY: 0
CONSOLABILITY: 0
BREATHING: 0
FACIALEXPRESSION: 0
FACIALEXPRESSION: 0
CONSOLABILITY: 0
NEGVOCALIZATION: 0

## 2020-01-23 ASSESSMENT — PAIN SCALES - GENERAL
PAINLEVEL_OUTOF10: 3
PAINLEVEL_OUTOF10: 0
PAINLEVEL_OUTOF10: 2

## 2020-01-23 NOTE — CARE COORDINATION
Call placed to [facility] to check status of referral. Left message for Caldwell Phalen to return writer's call. Left Rosalind Diggs message also for return call to get a determination of whether the facility can accept or not. Patient needs 3 night inpatient stay to qualify for skilled rehab under Medicare guidelines.

## 2020-01-23 NOTE — PROGRESS NOTES
Hospitalist Progress Note      PCP: Jennifer Mejia    Date of Admission: 1/21/2020    Chief Complaint: AMS and fall    Hospital Course: See H&P    Subjective:   Patient is up in bed, comfortable, not in distress. Denies any chest pain or shortness of breath. Patient is pleasantly confused. No new event overnight noted. Medications:  Reviewed    Infusion Medications    sodium chloride 75 mL/hr at 01/23/20 0610     Scheduled Medications    atenolol  25 mg Oral Daily    atorvastatin  10 mg Oral Daily    clopidogrel  75 mg Oral Daily    lisinopril  2.5 mg Oral Daily    memantine  5 mg Oral BID    tamsulosin  0.4 mg Oral Daily    sodium chloride flush  10 mL Intravenous 2 times per day    enoxaparin  30 mg Subcutaneous Daily    azithromycin  500 mg Intravenous Q24H    And    cefTRIAXone (ROCEPHIN) IV  1 g Intravenous Q24H     PRN Meds: sodium chloride flush, magnesium hydroxide, ondansetron, acetaminophen, albuterol      Intake/Output Summary (Last 24 hours) at 1/23/2020 1105  Last data filed at 1/23/2020 0912  Gross per 24 hour   Intake 3641.34 ml   Output --   Net 3641.34 ml       Physical Exam Performed:    BP (!) 125/59   Pulse 85   Temp 97.4 °F (36.3 °C) (Oral)   Resp 16   Ht 5' 10\" (1.778 m)   Wt 158 lb (71.7 kg)   SpO2 93%   BMI 22.67 kg/m²     General appearance: No apparent distress, appears confused  HEENT: Pupils equal, round, and reactive to light. Conjunctivae/corneas clear. Neck: Supple, with full range of motion. No jugular venous distention. Trachea midline. Respiratory:  Normal respiratory effort. Clear to auscultation, bilaterally without Rales/Wheezes/Rhonchi. Cardiovascular: Regular rate and rhythm with normal S1/S2 without murmurs, rubs or gallops. Abdomen: Soft, non-tender, non-distended with normal bowel sounds. Musculoskeletal: No clubbing, cyanosis or edema bilaterally. Full range of motion without deformity.   Skin: Skin color, texture, turgor normal. No rashes or lesions. Neurologic:  Neurovascularly intact without any focal sensory/motor deficits. Cranial nerves: II-XII intact, grossly non-focal.  Psychiatric: Confused  Capillary Refill: Brisk,< 3 seconds   Peripheral Pulses: +2 palpable, equal bilaterally       Labs:   Recent Labs     01/21/20 2315 01/22/20  0625   WBC 16.2* 15.8*   HGB 16.9 14.7   HCT 49.9 43.9    177     Recent Labs     01/21/20 2315 01/22/20  0625   * 142   K 4.4 3.9    107   CO2 24 23   BUN 25* 21*   CREATININE 1.7* 1.2   CALCIUM 9.9 9.0     Recent Labs     01/21/20 2315   AST 27   ALT 24   BILITOT 1.1*   ALKPHOS 109     No results for input(s): INR in the last 72 hours. Recent Labs     01/21/20 2315 01/22/20  0625   TROPONINI 0.03* 0.02*       Urinalysis:      Lab Results   Component Value Date    NITRU Negative 01/21/2020    WBCUA 0-2 01/21/2020    BACTERIA 1+ 01/21/2020    RBCUA 3-5 01/21/2020    BLOODU MODERATE 01/21/2020    SPECGRAV >=1.030 01/21/2020    GLUCOSEU Negative 01/21/2020       Radiology:  MRI BRAIN WO CONTRAST   Final Result   No acute infarct. CT Head WO Contrast   Final Result   No acute intracranial abnormality. Left sphenoid sinus disease and right mastoid effusion could be acute or   chronic. XR CHEST PORTABLE   Final Result   Suspected left pleural effusion with left basilar atelectasis versus   pneumonia. Assessment/Plan:    Active Hospital Problems    Diagnosis    AMS (altered mental status) [R41.82]    Pneumonia [J18.9]    DIMAS (acute kidney injury) ([facility] [address] [N17.9]     Frequent falls in setting of acute encephalopathy in patient with known dementia  -CT head revealed: No acute intracranial abnormality  -tele monitoring  -continue namenda  -case management consult - possible placement  -Clinically improving, patient is responding to verbal commands appropriately.   Continue supportive care.     Pneumonia  -CXR revealed: suspected left pleural effusion with left basilar atelectasis versus pneumonia  -azithromycin and ceftriaxone given in ED and continued  -encourage coughing and deep breathing  -oxygen therapy protocol     DIMAS, cr 1.7 on admission  -baseline cr appears to be 1.0  -likely 2/2 dehydration  -1 L NS given in ED  -Monitor.     Elevated troponin, 0.03 on admission  -no s/s of chest pain at this time  -likely 2/2 demand ischemia  -trend  -tele monitoring     Essential HTN  -continue atenolol and lisinopril     HLD  -continue atorvastatin     Leukocytosis, 16.1 on admission  -2/2 above  -UA negative for infection  -Continue to monitor.     DVT Prophylaxis: Lovenox  Diet: DIET GENERAL;  Code Status: Full Code    PT/OT Eval Status: Ordered    Dispo -in 1 to 2 days    Laura Young MD

## 2020-01-23 NOTE — PROGRESS NOTES
4 Eyes Skin Assessment     The patient is being assess for  Shift Handoff    I agree that 2 RN's have performed a thorough Head to Toe Skin Assessment on the patient. ALL assessment sites listed below have been assessed. Areas assessed by both nurses: Char[place]/Tierra  [x]   Head, Face, and Ears   [x]   Shoulders, Back, and Chest  [x]   Arms, Elbows, and Hands   [x]   Coccyx, Sacrum, and IschIum  [x]   Legs, Feet, and Heels        Does the Patient have Skin Breakdown?   No         Kp Prevention initiated:  Yes   Wound Care Orders initiated:  No      [facility] nurse consulted for Pressure Injury (Stage 3,4, Unstageable, DTI, NWPT, and Complex wounds), New and Established Ostomies:  No      Nurse 1 eSignature: Electronically signed by Yoni Scott RN on 1/23/20 at 4:34 AM    **SHARE this note so that the co-signing nurse is able to place an eSignature**    Nurse 2 eSignature: Electronically signed by Barrington Singer RN on 1/23/20 at 4:34 AM

## 2020-01-23 NOTE — PROGRESS NOTES
Pt alert but with confusion, nonverbal with staff at times. Shift assessment updated and documented. VSS. Will continue to monitor.

## 2020-01-23 NOTE — PROGRESS NOTES
Score  [facility] Inpatient Mobility Raw Score : 10 (01/23/20 1630)  AM-PAC Inpatient T-Scale Score : 32.29 (01/23/20 1630)  Mobility Inpatient CMS 0-100% Score: 76.75 (01/23/20 1630)  Mobility Inpatient CMS G-Code Modifier : CL (01/23/20 1630)        Goals  Short term goals  Time Frame for Short term goals: 1 week (1/29) unless otherwise specified  Short term goal 1: Pt will be mod A for bed mobility. Short term goal 2: Pt will be mod A for transfers with LRAD. Short term goal 3: Pt will ambulate 10 ft with mod A x 2 and LRAD. Short term goal 4: 1/26: Pt will participate in 10 reps of BLE exercises to promote strength and activity tolerance. Met 1/23/20. Patient Goals   Patient goals : none stated by pt d/t dementia    Plan    Plan  Times per week: 2-3x/wk  Times per day: Daily  Specific instructions for Next Treatment: progress mobility as tolerated  Current Treatment Recommendations: Strengthening, Gait Training, Patient/Caregiver Education & Training, ROM, Equipment Evaluation, Education, & procurement, Balance Training, Neuromuscular Re-education, Functional Mobility Training, Endurance Training, Transfer Training, Safety Education & Training  Safety Devices  Type of devices:  All fall risk precautions in place, Bed alarm in place, Call light within reach, Left in bed, Patient at risk for falls  Restraints  Initially in place: No     Therapy Time   Individual Concurrent Group Co-treatment   Time In 1550         Time Out 1614         Minutes 24         Timed Code Treatment Minutes: 1100 Loretta Gasca, PT

## 2020-01-23 NOTE — PROGRESS NOTES
Shift assessment as charted. VSS. Pt is alert and non-verbal thus far this shift. BS active x 4. Lungs are CTA in all lobes, diminished in bilateral bases. Incontinent of B/B. Diaper changed and pt repositioned Q2H and prn by staff. Pt is combative with care. Bed alarm on. AVASYS in use for patient safety. Bed in low position, wheels locked, SR x 2, call light and bedside table within reach. Will monitor.

## 2020-01-23 NOTE — PROGRESS NOTES
Pt remains combative with repositioning and diaper changes, however he responds well to soft spoken voice and explanations of care. Pt able to verbalize that he was warm and comfortable in bed. Unable to tell me , time or location. Bed alarm on, AVASYS for safety, SR X 3.  Bed in low position, wheels locked. Call light within reach. Will continue to monitor.

## 2020-01-24 LAB
ANION GAP SERPL CALCULATED.3IONS-SCNC: 11 MMOL/L (ref 3–16)
BASOPHILS ABSOLUTE: 0.1 K/UL (ref 0–0.2)
BASOPHILS RELATIVE PERCENT: 1.3 %
BUN BLDV-MCNC: 16 MG/DL (ref 7–20)
CALCIUM SERPL-MCNC: 8.3 MG/DL (ref 8.3–10.6)
CHLORIDE BLD-SCNC: 104 MMOL/L (ref 99–110)
CO2: 20 MMOL/L (ref 21–32)
CREAT SERPL-MCNC: 1.1 MG/DL (ref 0.8–1.3)
EOSINOPHILS ABSOLUTE: 0.7 K/UL (ref 0–0.6)
EOSINOPHILS RELATIVE PERCENT: 6.1 %
GFR AFRICAN AMERICAN: >60
GFR NON-AFRICAN AMERICAN: >60
GLUCOSE BLD-MCNC: 160 MG/DL (ref 70–99)
HCT VFR BLD CALC: 40.4 % (ref 40.5–52.5)
HEMOGLOBIN: 13.6 G/DL (ref 13.5–17.5)
LYMPHOCYTES ABSOLUTE: 4.3 K/UL (ref 1–5.1)
LYMPHOCYTES RELATIVE PERCENT: 38.3 %
MCH RBC QN AUTO: 33.5 PG (ref 26–34)
MCHC RBC AUTO-ENTMCNC: 33.7 G/DL (ref 31–36)
MCV RBC AUTO: 99.3 FL (ref 80–100)
MONOCYTES ABSOLUTE: 1.2 K/UL (ref 0–1.3)
MONOCYTES RELATIVE PERCENT: 10.4 %
NEUTROPHILS ABSOLUTE: 5 K/UL (ref 1.7–7.7)
NEUTROPHILS RELATIVE PERCENT: 43.9 %
PDW BLD-RTO: 12.1 % (ref 12.4–15.4)
PLATELET # BLD: 144 K/UL (ref 135–450)
PMV BLD AUTO: 7.6 FL (ref 5–10.5)
POTASSIUM SERPL-SCNC: 4 MMOL/L (ref 3.5–5.1)
RBC # BLD: 4.07 M/UL (ref 4.2–5.9)
SODIUM BLD-SCNC: 135 MMOL/L (ref 136–145)
WBC # BLD: 11.3 K/UL (ref 4–11)

## 2020-01-24 PROCEDURE — 2580000003 HC RX 258: Performed by: NURSE PRACTITIONER

## 2020-01-24 PROCEDURE — 1200000000 HC SEMI PRIVATE

## 2020-01-24 PROCEDURE — 97530 THERAPEUTIC ACTIVITIES: CPT

## 2020-01-24 PROCEDURE — 6370000000 HC RX 637 (ALT 250 FOR IP): Performed by: NURSE PRACTITIONER

## 2020-01-24 PROCEDURE — 97116 GAIT TRAINING THERAPY: CPT

## 2020-01-24 PROCEDURE — 6360000002 HC RX W HCPCS: Performed by: NURSE PRACTITIONER

## 2020-01-24 PROCEDURE — 36415 COLL VENOUS BLD VENIPUNCTURE: CPT

## 2020-01-24 PROCEDURE — 80048 BASIC METABOLIC PNL TOTAL CA: CPT

## 2020-01-24 PROCEDURE — 85025 COMPLETE CBC W/AUTO DIFF WBC: CPT

## 2020-01-24 RX ORDER — HALOPERIDOL 5 MG/ML
2 INJECTION INTRAMUSCULAR ONCE
Status: COMPLETED | OUTPATIENT
Start: 2020-01-24 | End: 2020-01-24

## 2020-01-24 RX ADMIN — ENOXAPARIN SODIUM 40 MG: 40 INJECTION SUBCUTANEOUS at 10:15

## 2020-01-24 RX ADMIN — CLOPIDOGREL BISULFATE 75 MG: 75 TABLET ORAL at 10:15

## 2020-01-24 RX ADMIN — HALOPERIDOL LACTATE 2 MG: 5 INJECTION INTRAMUSCULAR at 22:23

## 2020-01-24 RX ADMIN — TAMSULOSIN HYDROCHLORIDE 0.4 MG: 0.4 CAPSULE ORAL at 10:15

## 2020-01-24 RX ADMIN — MEMANTINE 5 MG: 5 TABLET ORAL at 10:15

## 2020-01-24 RX ADMIN — CEFTRIAXONE SODIUM 1 G: 1 INJECTION, POWDER, FOR SOLUTION INTRAMUSCULAR; INTRAVENOUS at 01:29

## 2020-01-24 RX ADMIN — ATENOLOL 25 MG: 25 TABLET ORAL at 10:15

## 2020-01-24 RX ADMIN — ATORVASTATIN CALCIUM 10 MG: 10 TABLET, FILM COATED ORAL at 10:15

## 2020-01-24 RX ADMIN — MEMANTINE 5 MG: 5 TABLET ORAL at 20:15

## 2020-01-24 RX ADMIN — AZITHROMYCIN MONOHYDRATE 500 MG: 500 INJECTION, POWDER, LYOPHILIZED, FOR SOLUTION INTRAVENOUS at 02:10

## 2020-01-24 RX ADMIN — LISINOPRIL 2.5 MG: 2.5 TABLET ORAL at 10:15

## 2020-01-24 RX ADMIN — SODIUM CHLORIDE: 9 INJECTION, SOLUTION INTRAVENOUS at 10:14

## 2020-01-24 ASSESSMENT — PAIN SCALES - PAIN ASSESSMENT IN ADVANCED DEMENTIA (PAINAD)
FACIALEXPRESSION: 0
NEGVOCALIZATION: 0
BODYLANGUAGE: 0
BREATHING: 1
TOTALSCORE: 1
CONSOLABILITY: 0

## 2020-01-24 NOTE — PROGRESS NOTES
care.    -Acute Metabolic Encephalopathy with underlying dementia POA     Pneumonia  -CXR revealed: suspected left pleural effusion with left basilar atelectasis versus pneumonia  -azithromycin and ceftriaxone given in ED and continued  -Clinically improving, consider changing to p.o. antibiotic upon discharge.     DIMAS, cr 1.7 on admission  -baseline cr appears to be 1.0  -likely 2/2 dehydration  -1 L NS given in ED  -Monitor.     Elevated troponin, 0.03 on admission  -no s/s of chest pain at this time  -likely 2/2 demand ischemia  -trend  -tele monitoring     Essential HTN  -continue atenolol and lisinopril     HLD  -continue atorvastatin     Leukocytosis, 16.1 on admission  -2/2 above  -UA negative for infection  -Continue to monitor. DVT Prophylaxis: Lovenox  Diet: DIET GENERAL;  Code Status: Full Code    PT/OT Eval Status: Ordered    Dispo -in a.mGenoveva Ureña MD

## 2020-01-24 NOTE — PROGRESS NOTES
therapy  Pain Assessment  Pain Assessment: Advanced Dementia  Vital Signs  Patient Currently in Pain: Denies   Orientation  Orientation  Overall Orientation Status: Impaired  Orientation Level: Disoriented X4(minimally verbal)  Objective    ADL  Feeding: Setup        Balance  Sitting Balance: Supervision  Standing Balance: Dependent/Total(with SHANTELLE ISLAS)  Standing Balance  Activity: sit<-->stand & bed-->chair  Comment: unable to understand how to use RW for transfer  Bed mobility  Supine to Sit: Maximum assistance(of 1 to Left)  Sit to Supine: Unable to assess(Left up in chair for lunch, pt & family agreeable)  Transfers  Sit to stand: 2 Person assistance(mod assist with SHANTELLE ISLAS)  Stand to sit: Dependent/Total(mod assist of 2 from [address] (assist for reaching for chair arm to sit flex hips & kness )        Cognition  Overall Cognitive Status: Exceptions(pleasant, alert but confused)  Arousal/Alertness: Inconsistent responses to stimuli  Following Commands: Inconsistently follows commands  Attention Span: Difficulty dividing attention  Memory: Decreased short term memory;Decreased recall of recent events;Decreased recall of biographical Information;Decreased long term memory  Safety Judgement: Decreased awareness of need for safety  Insights: Not aware of deficits  Initiation: Requires cues for all  Sequencing: Requires cues for some        Plan   Plan  Times per week: 2-3x's a week while in acute care  Current Treatment Recommendations: Functional Mobility Training, Balance Training, Safety Education & Training, Self-Care / ADL, Patient/Caregiver Education & Training    AM-PAC Score        [facility] Daily [facility] Raw Score: 15 (01/24/20 1208)  AM-PAC Inpatient ADL T-Scale Score : 34.69 (01/24/20 1208)  ADL Inpatient CMS 0-100% Score: 56.46 (01/24/20 1208)  ADL Inpatient CMS G-Code Modifier : CK (01/24/20 1208)    Goals  Short term goals  Time Frame for Short term goals: 1 week unless otherwise

## 2020-01-24 NOTE — CARE COORDINATION
Writer received message from [facility] that they are not able to accept patient. Next choice is [facility]. Referral sent to [facility] [place] with [facility]. They are reviewing now.  Agata Arteaga RN

## 2020-01-25 LAB
ANION GAP SERPL CALCULATED.3IONS-SCNC: 12 MMOL/L (ref 3–16)
BASOPHILS ABSOLUTE: 0 K/UL (ref 0–0.2)
BASOPHILS RELATIVE PERCENT: 0.4 %
BUN BLDV-MCNC: 11 MG/DL (ref 7–20)
CALCIUM SERPL-MCNC: 8.5 MG/DL (ref 8.3–10.6)
CHLORIDE BLD-SCNC: 101 MMOL/L (ref 99–110)
CO2: 19 MMOL/L (ref 21–32)
CREAT SERPL-MCNC: 1 MG/DL (ref 0.8–1.3)
EOSINOPHILS ABSOLUTE: 0.5 K/UL (ref 0–0.6)
EOSINOPHILS RELATIVE PERCENT: 5.1 %
GFR AFRICAN AMERICAN: >60
GFR NON-AFRICAN AMERICAN: >60
GLUCOSE BLD-MCNC: 171 MG/DL (ref 70–99)
HCT VFR BLD CALC: 42 % (ref 40.5–52.5)
HEMOGLOBIN: 14.3 G/DL (ref 13.5–17.5)
LYMPHOCYTES ABSOLUTE: 4.7 K/UL (ref 1–5.1)
LYMPHOCYTES RELATIVE PERCENT: 44.6 %
MCH RBC QN AUTO: 33.4 PG (ref 26–34)
MCHC RBC AUTO-ENTMCNC: 34.1 G/DL (ref 31–36)
MCV RBC AUTO: 98.1 FL (ref 80–100)
MONOCYTES ABSOLUTE: 0.9 K/UL (ref 0–1.3)
MONOCYTES RELATIVE PERCENT: 8.2 %
NEUTROPHILS ABSOLUTE: 4.4 K/UL (ref 1.7–7.7)
NEUTROPHILS RELATIVE PERCENT: 41.7 %
PDW BLD-RTO: 12.2 % (ref 12.4–15.4)
PLATELET # BLD: 188 K/UL (ref 135–450)
PMV BLD AUTO: 7.7 FL (ref 5–10.5)
POTASSIUM SERPL-SCNC: 3.7 MMOL/L (ref 3.5–5.1)
RBC # BLD: 4.28 M/UL (ref 4.2–5.9)
SODIUM BLD-SCNC: 132 MMOL/L (ref 136–145)
WBC # BLD: 10.6 K/UL (ref 4–11)

## 2020-01-25 PROCEDURE — 6360000002 HC RX W HCPCS: Performed by: NURSE PRACTITIONER

## 2020-01-25 PROCEDURE — 85025 COMPLETE CBC W/AUTO DIFF WBC: CPT

## 2020-01-25 PROCEDURE — 80048 BASIC METABOLIC PNL TOTAL CA: CPT

## 2020-01-25 PROCEDURE — 6370000000 HC RX 637 (ALT 250 FOR IP): Performed by: NURSE PRACTITIONER

## 2020-01-25 PROCEDURE — 2580000003 HC RX 258: Performed by: NURSE PRACTITIONER

## 2020-01-25 PROCEDURE — 1200000000 HC SEMI PRIVATE

## 2020-01-25 PROCEDURE — 36415 COLL VENOUS BLD VENIPUNCTURE: CPT

## 2020-01-25 RX ADMIN — ATORVASTATIN CALCIUM 10 MG: 10 TABLET, FILM COATED ORAL at 09:58

## 2020-01-25 RX ADMIN — LISINOPRIL 2.5 MG: 2.5 TABLET ORAL at 09:58

## 2020-01-25 RX ADMIN — SODIUM CHLORIDE: 9 INJECTION, SOLUTION INTRAVENOUS at 17:43

## 2020-01-25 RX ADMIN — SODIUM CHLORIDE: 9 INJECTION, SOLUTION INTRAVENOUS at 00:07

## 2020-01-25 RX ADMIN — CEFTRIAXONE SODIUM 1 G: 1 INJECTION, POWDER, FOR SOLUTION INTRAMUSCULAR; INTRAVENOUS at 00:34

## 2020-01-25 RX ADMIN — CLOPIDOGREL BISULFATE 75 MG: 75 TABLET ORAL at 09:58

## 2020-01-25 RX ADMIN — ATENOLOL 25 MG: 25 TABLET ORAL at 09:58

## 2020-01-25 RX ADMIN — MEMANTINE 5 MG: 5 TABLET ORAL at 19:59

## 2020-01-25 RX ADMIN — TAMSULOSIN HYDROCHLORIDE 0.4 MG: 0.4 CAPSULE ORAL at 09:58

## 2020-01-25 RX ADMIN — AZITHROMYCIN MONOHYDRATE 500 MG: 500 INJECTION, POWDER, LYOPHILIZED, FOR SOLUTION INTRAVENOUS at 02:18

## 2020-01-25 RX ADMIN — MEMANTINE 5 MG: 5 TABLET ORAL at 09:58

## 2020-01-25 RX ADMIN — ENOXAPARIN SODIUM 40 MG: 40 INJECTION SUBCUTANEOUS at 09:59

## 2020-01-25 NOTE — PROGRESS NOTES
rashes or lesions. Neurologic:  Neurovascularly intact without any focal sensory/motor deficits. Cranial nerves: II-XII intact, grossly non-focal.  Psychiatric: Confused  Capillary Refill: Brisk,< 3 seconds   Peripheral Pulses: +2 palpable, equal bilaterally       Labs:   Recent Labs     01/24/20  1113   WBC 11.3*   HGB 13.6   HCT 40.4*        Recent Labs     01/24/20  1113   *   K 4.0      CO2 20*   BUN 16   CREATININE 1.1   CALCIUM 8.3       Urinalysis:      Lab Results   Component Value Date    NITRU Negative 01/21/2020    WBCUA 0-2 01/21/2020    BACTERIA 1+ 01/21/2020    RBCUA 3-5 01/21/2020    BLOODU MODERATE 01/21/2020    SPECGRAV >=1.030 01/21/2020    GLUCOSEU Negative 01/21/2020       Radiology:  MRI BRAIN WO CONTRAST   Final Result   No acute infarct. CT Head WO Contrast   Final Result   No acute intracranial abnormality. Left sphenoid sinus disease and right mastoid effusion could be acute or   chronic. XR CHEST PORTABLE   Final Result   Suspected left pleural effusion with left basilar atelectasis versus   pneumonia. Assessment/Plan:    Active Hospital Problems    Diagnosis    AMS (altered mental status) [R41.82]    Pneumonia [J18.9]    DIMAS (acute kidney injury) ([facility] [address] [N17.9]     Frequent falls in setting of acute encephalopathy in patient with known dementia  -CT head revealed: No acute intracranial abnormality  -tele monitoring  -continue namenda  -case management consult - possible placement  -Clinically improving, patient is responding to verbal commands appropriately.   Continue supportive care.    -Acute Metabolic Encephalopathy with underlying dementia POA     Pneumonia  -CXR revealed: suspected left pleural effusion with left basilar atelectasis versus pneumonia  -azithromycin and ceftriaxone given in ED and continued  -Clinically improving, consider changing to p.o. antibiotic upon discharge.     DIMAS, cr 1.7 on admission  -baseline cr appears to be 1.0  -likely 2/2 dehydration  -1 L NS given in ED  -Monitor.     Elevated troponin, 0.03 on admission  -no s/s of chest pain at this time  -likely 2/2 demand ischemia  -trend  -tele monitoring     Essential HTN  -continue atenolol and lisinopril     HLD  -continue atorvastatin     Leukocytosis, 16.1 on admission  -2/2 above  -UA negative for infection  -Continue to monitor. DVT Prophylaxis: Lovenox  Diet: DIET GENERAL;  Code Status: Full Code    PT/OT Eval Status: Ordered    Dispo -in joseph Jovel MD

## 2020-01-25 NOTE — PROGRESS NOTES
RESPIRATORY THERAPY ASSESSMENT    Name:  [address] [place] [address] Record Number:  [id number]  Age: 80 y.o. Gender: male  : 1935  Today's Date:  2020  Room:  [address] [place] [address]-    Assessment     Is the patient being admitted for a COPD or Asthma exacerbation? 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 They will be assessed with the first treatment and at least every 72 hours thereafter. The following severity scale will be used to determine frequency of treatment intervention.     Smoking History: No Smoking History = 0    Social History  Social History     Tobacco Use    Smoking status: Former Smoker    Smokeless tobacco: Never Used    Tobacco comment: stopped over 50 years ago   Substance Use Topics    Alcohol use: Never     Alcohol/week: 1.0 standard drinks     Types: 1 Glasses of wine per week     Frequency: Never     Comment: minimal    Drug use: No       Recent Surgical History: None = 0  Past Surgical History  Past Surgical History:   Procedure Laterality Date    APPENDECTOMY      BLADDER SURGERY      HERNIA REPAIR         Level of Consciousness: Alert, Follows Commands but Disoriented = 1    Level of Activity: Non weight bearing- transfers bed to chair only = 3    Respiratory Pattern: Regular Pattern; RR 8-20 = 0    Breath Sounds: Clear = 0    Sputum   ,  , Sputum How Obtained: None  Cough: Strong, spontaneous, non-productive = 0    Vital Signs   BP (!) 141/67   Pulse 66   Temp 97.5 °F (36.4 °C) (Oral)   Resp 18   Ht 5' 10\" (1.778 m)   Wt 158 lb (71.7 kg)   SpO2 93%   BMI 22.67 kg/m²   SPO2 (COPD values may differ): Greater than or equal to 92% on room air = 0    Peak Flow (asthma only): not applicable = 0    RSI: 0-4 = See once and convert to home regimen or discontinue        Plan       Goals: medication delivery, mobilize retained secretions, volume expansion and improve oxygenation    Patient/caregiver was educated on the proper method of use for Respiratory Care Devices:  Yes      Level of patient/caregiver understanding able to:   ? Verbalize understanding   ? Demonstrate understanding       ? Teach back        ? Needs reinforcement       ? No available caregiver               ? Other:     Response to education:  Very Good     Is patient being placed on Home Treatment Regimen? Yes     Does the patient have everything they need prior to discharge? NA     Comments: patient and chart assessed    Plan of Care: Home regimen    Electronically signed by Justine Izquierdo RCP on 1/25/2020 at 4:38 PM    Respiratory Protocol Guidelines     1. Assessment and treatment by Respiratory Therapy will be initiated for medication and therapeutic interventions upon initiation of aerosolized medication. 2. Physician will be contacted for respiratory rate (RR) greater than 35 breaths per minute. Therapy will be held for heart rate (HR) greater than 140 beats per minute, pending direction from physician. 3. Bronchodilators will be administered via Metered Dose Inhaler (MDI) with spacer when the following criteria are met:  a. Alert and cooperative     b. HR < 140 bpm  c. RR < 30 bpm                d. Can demonstrate a 2-3 second inspiratory hold  4.  Bronchodilators will be administered via Hand Held Nebulizer JOAN [facility]) to patients when ANY of the following criteria are met  a. Incognizant or uncooperative          b. Patients treated with HHN at Home        c. Unable to demonstrate proper use of MDI with spacer     d. RR > 30 bpm   5. Bronchodilators will be delivered via Metered Dose Inhaler (MDI), HHN, Aerogen to intubated patients on mechanical ventilation. 6. Inhalation medication orders will be delivered and/or substituted as outlined below. Aerosolized Medications Ordering and Administration Guidelines:    1. All Medications will be ordered by a physician, and their frequency and/or modality will be adjusted as defined by the patients Respiratory Severity Index (RSI) score. 2. If the patient does not have documented COPD, consider discontinuing anticholinergics when RSI is less than 9.  3. If the bronchospasm worsens (increased RSI), then the bronchodilator frequency can be increased to a maximum of every 4 hours. If greater than every 4 hours is required, the physician will be contacted. 4. If the bronchospasm improves, the frequency of the bronchodilator can be decreased, based on the patient's RSI, but not less than home treatment regimen frequency. 5. Bronchodilator(s) will be discontinued if patient has a RSI less than 9 and has received no scheduled or as needed treatment for 72  Hrs. Patients Ordered on a Mucolytic Agent:    1. Must always be administered with a bronchodilator. 2. Discontinue if patient experiences worsened bronchospasm, or secretions have lessened to the point that the patient is able to clear them with a cough. Anti-inflammatory and Combination Medications:    1. If the patient lacks prior history of lung disease, is not using inhaled anti-inflammatory medication at home, and lacks wheezing by examination or by history for at least 24 hours, contact physician for possible discontinuation.

## 2020-01-25 NOTE — PROGRESS NOTES
Patient assessment complete and documented. VSS. Alert to self at times. Patient with active AVASYS, and bed alarm on. Stage 2 on Right buttock. Patient incontinent at times, unable to place protective foam at this time due to incontinence. Patients bed in lowest locked position with call light within reach. Will continue to assess situation.

## 2020-01-26 PROCEDURE — 6370000000 HC RX 637 (ALT 250 FOR IP): Performed by: NURSE PRACTITIONER

## 2020-01-26 PROCEDURE — 6360000002 HC RX W HCPCS: Performed by: NURSE PRACTITIONER

## 2020-01-26 PROCEDURE — 2580000003 HC RX 258: Performed by: NURSE PRACTITIONER

## 2020-01-26 PROCEDURE — 1200000000 HC SEMI PRIVATE

## 2020-01-26 PROCEDURE — 6370000000 HC RX 637 (ALT 250 FOR IP): Performed by: INTERNAL MEDICINE

## 2020-01-26 RX ORDER — CEFUROXIME AXETIL 250 MG/1
500 TABLET ORAL EVERY 12 HOURS SCHEDULED
Status: DISCONTINUED | OUTPATIENT
Start: 2020-01-26 | End: 2020-01-27 | Stop reason: HOSPADM

## 2020-01-26 RX ORDER — AZITHROMYCIN 250 MG/1
250 TABLET, FILM COATED ORAL DAILY
Status: DISCONTINUED | OUTPATIENT
Start: 2020-01-26 | End: 2020-01-27 | Stop reason: HOSPADM

## 2020-01-26 RX ADMIN — SODIUM CHLORIDE: 9 INJECTION, SOLUTION INTRAVENOUS at 11:43

## 2020-01-26 RX ADMIN — AZITHROMYCIN MONOHYDRATE 250 MG: 250 TABLET ORAL at 09:09

## 2020-01-26 RX ADMIN — CEFUROXIME AXETIL 500 MG: 250 TABLET ORAL at 19:33

## 2020-01-26 RX ADMIN — CEFUROXIME AXETIL 500 MG: 250 TABLET ORAL at 09:09

## 2020-01-26 RX ADMIN — CEFTRIAXONE SODIUM 1 G: 1 INJECTION, POWDER, FOR SOLUTION INTRAMUSCULAR; INTRAVENOUS at 00:00

## 2020-01-26 RX ADMIN — TAMSULOSIN HYDROCHLORIDE 0.4 MG: 0.4 CAPSULE ORAL at 08:38

## 2020-01-26 RX ADMIN — ATORVASTATIN CALCIUM 10 MG: 10 TABLET, FILM COATED ORAL at 08:38

## 2020-01-26 RX ADMIN — MEMANTINE 5 MG: 5 TABLET ORAL at 19:33

## 2020-01-26 RX ADMIN — ATENOLOL 25 MG: 25 TABLET ORAL at 08:38

## 2020-01-26 RX ADMIN — LISINOPRIL 2.5 MG: 2.5 TABLET ORAL at 08:38

## 2020-01-26 RX ADMIN — ENOXAPARIN SODIUM 40 MG: 40 INJECTION SUBCUTANEOUS at 08:38

## 2020-01-26 RX ADMIN — MEMANTINE 5 MG: 5 TABLET ORAL at 08:38

## 2020-01-26 RX ADMIN — CLOPIDOGREL BISULFATE 75 MG: 75 TABLET ORAL at 08:38

## 2020-01-26 NOTE — PROGRESS NOTES
4 Eyes Skin Assessment     The patient is being assess for  Shift Handoff    I agree that 2 RN's have performed a thorough Head to Toe Skin Assessment on the patient. ALL assessment sites listed below have been assessed. Areas assessed by both nurses:    [x]   Head, Face, and Ears   [x]   Shoulders, Back, and Chest  [x]   Arms, Elbows, and Hands   [x]   Coccyx, Sacrum, and Ischum  [x]   Legs, Feet, and Heels    Stage two buttocks, zinc cream applied no mepilex d/t frequent incontinence  Scattered bruising      Does the Patient have Skin Breakdown?   Yes a wound was noted on the Admission Assessment and an WOUND LDA was Initiated documentation include the Ivet-wound, Wound Assessment, Measurements, Dressing Treatment, Drainage, and Color\",         Kp Prevention initiated:  Yes   Wound Care Orders initiated:  No      WOC nurse consulted for Pressure Injury (Stage 3,4, Unstageable, DTI, NWPT, and Complex wounds):  No      Nurse 1 eSignature: Electronically signed by Yarely Franz RN on 1/26/20 at 6:53 AM    **SHARE this note so that the co-signing nurse is able to place an eSignature**    Nurse 2 eSignature: Electronically signed by Denise Matthew RN on 1/26/20 at 6:49 AM

## 2020-01-26 NOTE — PROGRESS NOTES
PS NP, \"Pt pulled IV out and has azithromycin IV that still needs given and 75mL/hr NS continuous. Pt is combative and is supposed to go to [facility] tomorrow. Note from today says, consider changing to p.o. antibiotic upon discharge. Can we change this dose to po and leave IV out since he is leaving tomorrow? Thank You.\"      347 No Abeba Merino, NP called writer and states if pt is d/c today and they plan to switch to PO antibiotics anyway then it is ok to leave IV out and pass on to days that he did not get 0230 dose of zithromycin so they can give it to him before leaving.

## 2020-01-26 NOTE — PROGRESS NOTES
Shift assessment updated as charted. VSS. RA. Patient able to state name but not able to answer any other questions. Bed alarm and AVASYS remain in use. Will monitor.

## 2020-01-26 NOTE — CARE COORDINATION
CARLEE spoke with Guanako Newton at [facility] and he states that cert is still pending.

## 2020-01-26 NOTE — PROGRESS NOTES
lesions. Neurologic:  Neurovascularly intact without any focal sensory/motor deficits. Cranial nerves: II-XII intact, grossly non-focal.  Psychiatric: Confused  Capillary Refill: Brisk,< 3 seconds   Peripheral Pulses: +2 palpable, equal bilaterally       Labs:   Recent Labs     01/24/20  1113 01/25/20  0954   WBC 11.3* 10.6   HGB 13.6 14.3   HCT 40.4* 42.0    188     Recent Labs     01/24/20  1113 01/25/20  0954   * 132*   K 4.0 3.7    101   CO2 20* 19*   BUN 16 11   CREATININE 1.1 1.0   CALCIUM 8.3 8.5       Urinalysis:      Lab Results   Component Value Date    NITRU Negative 01/21/2020    WBCUA 0-2 01/21/2020    BACTERIA 1+ 01/21/2020    RBCUA 3-5 01/21/2020    BLOODU MODERATE 01/21/2020    SPECGRAV >=1.030 01/21/2020    GLUCOSEU Negative 01/21/2020       Radiology:  MRI BRAIN WO CONTRAST   Final Result   No acute infarct. CT Head WO Contrast   Final Result   No acute intracranial abnormality. Left sphenoid sinus disease and right mastoid effusion could be acute or   chronic. XR CHEST PORTABLE   Final Result   Suspected left pleural effusion with left basilar atelectasis versus   pneumonia. Assessment/Plan:    Active Hospital Problems    Diagnosis    AMS (altered mental status) [R41.82]    Pneumonia [J18.9]    DIMAS (acute kidney injury) ([facility] [address] [N17.9]     Frequent falls in setting of acute encephalopathy in patient with known dementia  -CT head revealed: No acute intracranial abnormality  -tele monitoring  -continue namenda  -case management consult - possible placement  -Clinically improving, patient is responding to verbal commands appropriately. Continue supportive care.    -Acute Metabolic Encephalopathy with underlying dementia POA     Pneumonia  -CXR revealed: suspected left pleural effusion with left basilar atelectasis versus pneumonia  -azithromycin and ceftriaxone given in ED and continued  -Clinically improving, consider changing to p.o.

## 2020-01-26 NOTE — PLAN OF CARE
Patient is a fall risk. AVASYS and bed alarm remains in use for patient safety. Bed locked in lowest position, call light in reach. Will monitor.

## 2020-01-26 NOTE — PLAN OF CARE
Avasys and bed check in place for safety. Room well lit and free of clutter. 3 side rails in use. Checking ang changing pt at least every 2 hours to prevent further skin breakdown. Using protective barrier. Will continue to monitor closely.

## 2020-01-27 VITALS
WEIGHT: 158 LBS | BODY MASS INDEX: 22.62 KG/M2 | HEART RATE: 65 BPM | OXYGEN SATURATION: 96 % | TEMPERATURE: 98.6 F | DIASTOLIC BLOOD PRESSURE: 88 MMHG | HEIGHT: 70 IN | RESPIRATION RATE: 16 BRPM | SYSTOLIC BLOOD PRESSURE: 145 MMHG

## 2020-01-27 PROCEDURE — 6360000002 HC RX W HCPCS: Performed by: NURSE PRACTITIONER

## 2020-01-27 PROCEDURE — 6370000000 HC RX 637 (ALT 250 FOR IP): Performed by: NURSE PRACTITIONER

## 2020-01-27 PROCEDURE — 97535 SELF CARE MNGMENT TRAINING: CPT

## 2020-01-27 PROCEDURE — 97530 THERAPEUTIC ACTIVITIES: CPT

## 2020-01-27 PROCEDURE — 2580000003 HC RX 258: Performed by: NURSE PRACTITIONER

## 2020-01-27 PROCEDURE — 6370000000 HC RX 637 (ALT 250 FOR IP): Performed by: INTERNAL MEDICINE

## 2020-01-27 RX ORDER — CEFUROXIME AXETIL 500 MG/1
500 TABLET ORAL EVERY 12 HOURS SCHEDULED
Qty: 14 TABLET | Refills: 0 | Status: SHIPPED | OUTPATIENT
Start: 2020-01-27 | End: 2020-02-03

## 2020-01-27 RX ORDER — AZITHROMYCIN 250 MG/1
250 TABLET, FILM COATED ORAL DAILY
Qty: 3 TABLET | Refills: 0 | Status: SHIPPED | OUTPATIENT
Start: 2020-01-28 | End: 2020-01-31

## 2020-01-27 RX ADMIN — ATORVASTATIN CALCIUM 10 MG: 10 TABLET, FILM COATED ORAL at 10:50

## 2020-01-27 RX ADMIN — TAMSULOSIN HYDROCHLORIDE 0.4 MG: 0.4 CAPSULE ORAL at 10:49

## 2020-01-27 RX ADMIN — MEMANTINE 5 MG: 5 TABLET ORAL at 10:50

## 2020-01-27 RX ADMIN — ENOXAPARIN SODIUM 40 MG: 40 INJECTION SUBCUTANEOUS at 10:50

## 2020-01-27 RX ADMIN — AZITHROMYCIN MONOHYDRATE 250 MG: 250 TABLET ORAL at 10:49

## 2020-01-27 RX ADMIN — Medication 10 ML: at 10:50

## 2020-01-27 RX ADMIN — CEFUROXIME AXETIL 500 MG: 250 TABLET ORAL at 10:49

## 2020-01-27 RX ADMIN — LISINOPRIL 2.5 MG: 2.5 TABLET ORAL at 10:49

## 2020-01-27 RX ADMIN — ATENOLOL 25 MG: 25 TABLET ORAL at 10:50

## 2020-01-27 RX ADMIN — CLOPIDOGREL BISULFATE 75 MG: 75 TABLET ORAL at 10:50

## 2020-01-27 NOTE — PROGRESS NOTES
session. General Comment  Comments: RN okay to see patient. Vital Signs  Patient Currently in Pain: Denies   Orientation  Orientation  Orientation Level: Disoriented X4(non-verbal, did not respond to questions)  Objective    ADL  Feeding: Setup  Grooming: Other (Comment)(pt declined washing face, pt grabed wash cloth and layed over nurse call light)  LE Dressing: Dependent/Total  Toileting: Dependent/Total        Balance  Sitting Balance: (posterior lean maxA to maintain upright sitting, however at times patient able to sit EOB with SBA.)  Bed mobility  Supine to Sit: Dependent/Total;2 Person assistance(to L)  Sit to Supine: Unable to assess(up to chair at end of session)     Transfers  Stand Pivot Transfers: 2 Person assistance;Maximum assistance(to pt left, bed to chair)  Sit to stand: 2 Person assistance; Moderate assistance;Maximum assistance(up to SW)  Stand to sit: Maximum assistance;2 Person assistance      Cognition  Overall Cognitive Status: Exceptions  Arousal/Alertness: Inconsistent responses to stimuli  Following Commands: Inconsistently follows commands  Attention Span: Difficulty dividing attention  Memory: Decreased short term memory;Decreased recall of recent events;Decreased recall of biographical Information;Decreased long term memory  Safety Judgement: Decreased awareness of need for safety  Insights: Not aware of deficits  Initiation: Requires cues for all  Sequencing: Requires cues for all      Type of ROM/Therapeutic Exercise  Comment: Pt unable to follow commands to actively participate in therapeutic exercises.       Plan   Plan  Times per week: 2-3x's a week while in acute care  Current Treatment Recommendations: Functional Mobility Training, Balance Training, Safety Education & Training, Self-Care / ADL, Patient/Caregiver Education & Training           AM-PAC Score        AM-[facility] Inpatient Daily Activity Raw Score: 12 (01/27/20 1022)  AM-PAC Inpatient ADL T-Scale Score : 30.6 (01/27/20 1022)  ADL Inpatient CMS 0-100% Score: 66.57 (01/27/20 1022)  ADL Inpatient CMS G-Code Modifier : CL (01/27/20 1022)    Goals  Short term goals  Time Frame for Short term goals: 1 week unless otherwise specified by 1/29  Short term goal 1: Pt will complete LE dressing with Jace  Short term goal 2: Pt will complete toilet transfers with Jace by 1/27- STG not met will downground to modA of 1 for functional transfers.    Short term goal 3: Pt will complete standing level ADLs with Jace for balance  Patient Goals   Patient goals : Pt did not voice       Therapy Time   Individual Concurrent Group Co-treatment   Time In       0945   Time Out       1015   Minutes       30   Timed Code Treatment Minutes: 30 Minutes       ZEE Randle/L

## 2020-01-27 NOTE — PROGRESS NOTES
lesions. Neurologic:  Neurovascularly intact without any focal sensory/motor deficits. Cranial nerves: II-XII intact, grossly non-focal.  Psychiatric: Confused  Capillary Refill: Brisk,< 3 seconds   Peripheral Pulses: +2 palpable, equal bilaterally       Labs:   Recent Labs     01/25/20  0954   WBC 10.6   HGB 14.3   HCT 42.0        Recent Labs     01/25/20  0954   *   K 3.7      CO2 19*   BUN 11   CREATININE 1.0   CALCIUM 8.5       Urinalysis:      Lab Results   Component Value Date    NITRU Negative 01/21/2020    WBCUA 0-2 01/21/2020    BACTERIA 1+ 01/21/2020    RBCUA 3-5 01/21/2020    BLOODU MODERATE 01/21/2020    SPECGRAV >=1.030 01/21/2020    GLUCOSEU Negative 01/21/2020       Radiology:  MRI BRAIN WO CONTRAST   Final Result   No acute infarct. CT Head WO Contrast   Final Result   No acute intracranial abnormality. Left sphenoid sinus disease and right mastoid effusion could be acute or   chronic. XR CHEST PORTABLE   Final Result   Suspected left pleural effusion with left basilar atelectasis versus   pneumonia. Assessment/Plan:    Active Hospital Problems    Diagnosis    AMS (altered mental status) [R41.82]    Pneumonia [J18.9]    DIMAS (acute kidney injury) ([facility] [address] [N17.9]     Frequent falls in setting of acute encephalopathy in patient with known dementia  -CT head revealed: No acute intracranial abnormality  -tele monitoring  -continue namenda  -case management consult - possible placement  -Clinically improving, patient is responding to verbal commands appropriately. Continue supportive care.    -Acute Metabolic Encephalopathy with underlying dementia POA     Pneumonia  -CXR revealed: suspected left pleural effusion with left basilar atelectasis versus pneumonia  -azithromycin and ceftriaxone given in ED and continued  -Clinically improving, consider changing to p.o. antibiotic upon discharge.   Clinically improving, patient lost IV access, antibiotics

## 2020-01-27 NOTE — PROGRESS NOTES
Physical Therapy  Facility/Department: [facility] C3 TELE/MED SURG/ONC  Daily Treatment Note  NAME: Joel Hou  : 1935  MRN: [id number]    Date of Service: 2020    Discharge Recommendations:  Subacute/Skilled Nursing Facility   PT Equipment Recommendations  Equipment Needed: No    Assessment   Body structures, Functions, Activity limitations: Decreased functional mobility ; Decreased cognition;Decreased endurance;Decreased coordination;Decreased strength;Decreased balance;Decreased safe awareness  Assessment: Pt demos impaired cognitive status decreasing his ability to participate in therapy this date. Pt requires Min<>Max A x1 at the EOB d/t leaning posteriorly and trying to resume supine position. Pt requires Mod A x2 for sit<>Stand and Max Ax2 for transfers with SW. Pt able to participate in minimal ther ex in a seated position, but limited by command following. Pt will benefit from further therapy to decrease the burden of care. Recommend SNF at d/c. Treatment Diagnosis: decreased functional mobility  Specific instructions for Next Treatment: progress mobility as tolerated  Prognosis: Fair  PT Education: Goals;PT Role;General Safety;Plan of Care;Transfer Training;Home Exercise Program;Family Education  Patient Education: pt will require reinforcement d/t impaired cognition  Barriers to Learning: cognition  REQUIRES PT FOLLOW UP: Yes  Activity Tolerance  Activity Tolerance: Patient limited by cognitive status     Patient Diagnosis(es): The primary encounter diagnosis was Fall, initial encounter. Diagnoses of Altered mental status, unspecified altered mental status type, At high risk for falls, At risk for injury associated with anticoagulation, and Community acquired pneumonia of left lung, unspecified part of lung were also pertinent to this visit. has a past medical history of CAD (coronary artery disease) and Cancer ([facility] [address]    has a past surgical history that includes Appendectomy; hernia repair; and Bladder surgery. Restrictions  Restrictions/Precautions  Restrictions/Precautions: General Precautions, Fall Risk  Position Activity Restriction  Other position/activity restrictions: Up as tolerated  Subjective   General  Chart Reviewed: Yes  Additional Pertinent Hx: Dementia  Response To Previous Treatment: Patient unable to report, no changes reported from family or staff  Family / Caregiver Present: No  Referring Practitioner: DENISE Renee  Subjective  Subjective: Non-verbal during session. Able to follow some commands with repetition. Does not appear in distress. Pain Assessment  Pain Assessment: Advanced Dementia       Orientation  Orientation  Overall Orientation Status: Impaired  Orientation Level: Disoriented X4  Cognition   Cognition  Overall Cognitive Status: Exceptions  Arousal/Alertness: Inconsistent responses to stimuli  Following Commands: Inconsistently follows commands  Attention Span: Difficulty dividing attention  Memory: Decreased short term memory;Decreased recall of recent events;Decreased recall of biographical Information;Decreased long term memory  Safety Judgement: Decreased awareness of need for safety  Insights: Not aware of deficits  Initiation: Requires cues for all  Sequencing: Requires cues for all  Objective   Bed mobility  Comment: Pt sitting up at the EOB upon entering the pt's room with OT. Pt requires Max A to sit at the EOB for safety, d/t the pt pushing posteriorly. Transfers  Sit to Stand: Moderate Assistance;2 Person Assistance  Stand to sit: Moderate Assistance;2 Person Assistance(For eccentric control)  Bed to Chair: Maximum assistance;2 Person Assistance(For positioning properly and making to the next surface. Pt attempting to sit prior to safe surface to sit to.)  Comment: Pt requires Max A for SW management and constant VC's.   Ambulation  Ambulation?: No(Pt having difficulty following directions this date.)     Balance  Posture: Good  Sitting - Static: Out 1008         Minutes 14         Timed Code Treatment Minutes: [address], [address]

## 2020-01-27 NOTE — DISCHARGE SUMMARY
Hospital Medicine Discharge Summary    Patient ID: Chana Powers      Patient's PCP: Oscar Fine Date: 1/21/2020     Discharge Date:   1/27/2020    Admitting Physician: Ford Manning MD     Discharge Physician: Olegario Collins MD     Discharge Diagnoses: Active Hospital Problems    Diagnosis    AMS (altered mental status) [R41.82]    Pneumonia [J18.9]    DIMAS (acute kidney injury) ([facility] [address] [N17.9]       The patient was seen and examined on day of discharge and this discharge summary is in conjunction with any daily progress note from day of discharge. Hospital Course:     Frequent falls in setting of acute encephalopathy in patient with known dementia  -CT head revealed: No acute intracranial abnormality  -tele monitoring  -continue namenda  -case management consult - possible placement  -Clinically improving, patient is responding to verbal commands appropriately. Continue supportive care.     -Acute Metabolic Encephalopathy with underlying dementia POA     Pneumonia  -CXR revealed: suspected left pleural effusion with left basilar atelectasis versus pneumonia  -azithromycin and ceftriaxone given in ED and continued  -Clinically improving, consider changing to p.o. antibiotic upon discharge. Clinically improving, patient lost IV access, antibiotics changed to p.o. Ceftin and azithromycin.     DIMAS, cr 1.7 on admission  -baseline cr appears to be 1.0  -likely 2/2 dehydration  -1 L NS given in ED  -Clinically improved.     Elevated troponin, 0.03 on admission  -no s/s of chest pain at this time  -likely 2/2 demand ischemia  -trend  -tele monitoring     Essential HTN  -continue atenolol and lisinopril     HLD  -continue atorvastatin     Leukocytosis, 16.1 on admission  -2/2 above  -UA negative for infection  -resolved.           Physical Exam Performed:     BP (!) 157/90   Pulse 66   Temp 98.5 °F (36.9 °C) (Oral)   Resp 16   Ht 5' 10\" (1.778 m)   Wt 158 lb (71.7 kg)   SpO2 atelectasis versus   pneumonia. Consults:     IP CONSULT TO HOSPITALIST  IP CONSULT TO CASE MANAGEMENT    Disposition:  SNF     Condition at Discharge: Stable    Discharge Instructions/Follow-up:  PCP in 2 weeks    Code Status:  Full Code     Activity: activity as tolerated    Diet: regular diet      Discharge Medications:     Current Discharge Medication List           Details   cefUROXime (CEFTIN) 500 MG tablet Take 1 tablet by mouth every 12 hours for 7 days  Qty: 14 tablet, Refills: 0      azithromycin (ZITHROMAX) 250 MG tablet Take 1 tablet by mouth daily for 3 days  Qty: 3 tablet, Refills: 0              Details   memantine (NAMENDA) 5 MG tablet Take 5 mg by mouth 2 times daily      atenolol (TENORMIN) 25 MG tablet Take 25 mg by mouth daily      lisinopril (PRINIVIL;ZESTRIL) 2.5 MG tablet Take 2.5 mg by mouth daily      silodosin (RAPAFLO) 8 MG CAPS Take 8 mg by mouth every evening      atorvastatin (LIPITOR) 10 MG tablet TAKE 1 TABLET EVERY DAY  Qty: 90 tablet, Refills: PRN      clopidogrel (PLAVIX) 75 MG tablet Take 1 tablet by mouth daily. Qty: 90 tablet, Refills: 1      cetirizine (ZYRTEC) 10 MG tablet Take 10 mg by mouth daily. Time Spent on discharge is more than 30 minutes in the examination, evaluation, counseling and review of medications and discharge plan. Signed:    Raul Wynn MD   1/27/2020      Thank you Baldomero Clemente for the opportunity to be involved in this patient's care. If you have any questions or concerns please feel free to contact me at [phone].

## 2020-01-27 NOTE — CARE COORDINATION
Patient still listed as Medicare primary this AM and actually [address] is primary. Notified UR nurse Jersey Osorio and registration to verify and get this changed on his insurance information. Also called Mckinley Pacheco at [facility] and he states pre-cert is still pending with [facility] . Notified PT/OT assigned to patient on treatment team today. Spoke with Marci Talley PT and requested patient be seen early and their assessments documented timely to facilitate getting a determination from Esvin Jacobo . Aware that patient is medically stable for discharge per Dr. Fady Gifford note 1/26. Will alert team once pre-cert is received for skilled rehab. ADDENDUM: If denied skilled patient has a LTC insurance policy through [employer] taken out at  years ago and has coverage to go intermediate to live there.